# Patient Record
Sex: FEMALE | Race: BLACK OR AFRICAN AMERICAN | NOT HISPANIC OR LATINO | Employment: PART TIME | URBAN - METROPOLITAN AREA
[De-identification: names, ages, dates, MRNs, and addresses within clinical notes are randomized per-mention and may not be internally consistent; named-entity substitution may affect disease eponyms.]

---

## 2022-08-01 ENCOUNTER — OFFICE VISIT (OUTPATIENT)
Dept: INTERNAL MEDICINE | Age: 27
End: 2022-08-01

## 2022-08-01 VITALS
HEIGHT: 68 IN | WEIGHT: 231 LBS | OXYGEN SATURATION: 99 % | HEART RATE: 58 BPM | DIASTOLIC BLOOD PRESSURE: 74 MMHG | BODY MASS INDEX: 35.01 KG/M2 | SYSTOLIC BLOOD PRESSURE: 124 MMHG | TEMPERATURE: 97.1 F

## 2022-08-01 DIAGNOSIS — Z72.0 NICOTINE ABUSE: ICD-10-CM

## 2022-08-01 DIAGNOSIS — F39 MOOD DISORDER: ICD-10-CM

## 2022-08-01 DIAGNOSIS — J45.20 MILD INTERMITTENT ASTHMA WITHOUT COMPLICATION: ICD-10-CM

## 2022-08-01 DIAGNOSIS — M79.672 LEFT FOOT PAIN: ICD-10-CM

## 2022-08-01 DIAGNOSIS — F12.10 MARIJUANA ABUSE: ICD-10-CM

## 2022-08-01 DIAGNOSIS — G89.29 CHRONIC LEFT SI JOINT PAIN: ICD-10-CM

## 2022-08-01 DIAGNOSIS — Z76.89 ENCOUNTER TO ESTABLISH CARE: Primary | ICD-10-CM

## 2022-08-01 DIAGNOSIS — G47.30 SLEEP APNEA, UNSPECIFIED TYPE: ICD-10-CM

## 2022-08-01 DIAGNOSIS — F32.A DEPRESSION, UNSPECIFIED DEPRESSION TYPE: ICD-10-CM

## 2022-08-01 DIAGNOSIS — Z30.432 AWAITING REMOVAL OF CONTRACEPTIVE INTRAUTERINE DEVICE (IUD): ICD-10-CM

## 2022-08-01 DIAGNOSIS — M53.3 CHRONIC LEFT SI JOINT PAIN: ICD-10-CM

## 2022-08-01 DIAGNOSIS — F90.2 ATTENTION DEFICIT HYPERACTIVITY DISORDER (ADHD), COMBINED TYPE: ICD-10-CM

## 2022-08-01 PROBLEM — G47.39 OTHER SLEEP APNEA: Status: ACTIVE | Noted: 2022-08-01

## 2022-08-01 PROCEDURE — 99204 OFFICE O/P NEW MOD 45 MIN: CPT

## 2022-08-01 RX ORDER — ALBUTEROL SULFATE 90 UG/1
2 AEROSOL, METERED RESPIRATORY (INHALATION) EVERY 4 HOURS PRN
COMMUNITY

## 2022-08-01 NOTE — PROGRESS NOTES
"    I N T E R N A L  M E D I C I N E  Tamara Banks, APRN    ENCOUNTER DATE:  08/01/2022    Bettye Amador / 27 y.o. / female      CHIEF COMPLAINT / REASON FOR OFFICE VISIT     Establish Care and Leg Pain (left)      ASSESSMENT & PLAN     Diagnoses and all orders for this visit:    1. Encounter to establish care (Primary)  -     CBC & Differential  -     Comprehensive Metabolic Panel  -     Hemoglobin A1c  -     Hepatitis C Antibody  -     Lipid Panel With / Chol / HDL Ratio  -     TSH+Free T4  -     Urinalysis With Microscopic If Indicated (No Culture) - Urine, Clean Catch  -     Vitamin D 25 Hydroxy    2. Chronic left SI joint pain  -     Ambulatory Referral to Physical Therapy Evaluate and treat    3. Left foot pain  -     XR Foot 3+ View Left    4. Sleep apnea, unspecified type  -     Ambulatory Referral to Sleep Medicine    5. Mild intermittent asthma without complication    6. Marijuana abuse    7. Nicotine abuse    8. Awaiting removal of contraceptive intrauterine device (IUD)  -     Ambulatory Referral to Obstetrics / Gynecology    9. Mood disorder (HCC)  -     Ambulatory Referral to Behavioral Health  -     NeuroPsych Testing; Future    10. Attention deficit hyperactivity disorder (ADHD), combined type  -     Ambulatory Referral to Behavioral Health  -     NeuroPsych Testing; Future    11. Depression, unspecified depression type  -     Ambulatory Referral to Behavioral Health  -     NeuroPsych Testing; Future         SUMMARY/DISCUSSION  • She was educated on importance of visiting ED for any worsening back pain, new numbness, tingling, weakness, bowel/ bladder changes.  • Agreeable to update labs and have annual physical in two weeks.  She is waiting for new medical insurance.        Next Appointment with me: Visit date not found    Return in about 1 week (around 8/8/2022) for Annual physical.      VITAL SIGNS     Visit Vitals  /74   Pulse 58   Temp 97.1 °F (36.2 °C) (Temporal)   Ht 171.5 cm (67.5\") " "  Wt 105 kg (231 lb)   LMP 07/03/2022   SpO2 99%   BMI 35.65 kg/m²             Wt Readings from Last 3 Encounters:   08/01/22 105 kg (231 lb)   07/28/22 104 kg (230 lb)     Body mass index is 35.65 kg/m².        MEDICATIONS AT THE TIME OF OFFICE VISIT     Current Outpatient Medications on File Prior to Visit   Medication Sig Dispense Refill   • albuterol sulfate  (90 Base) MCG/ACT inhaler Inhale 2 puffs Every 4 (Four) Hours As Needed.     • cyclobenzaprine (FLEXERIL) 10 MG tablet Take 1 tablet by mouth 3 (Three) Times a Day for 5 days. 15 tablet 0   • methylPREDNISolone (MEDROL) 4 MG dose pack Take as directed on package instructions 21 tablet 0   • traMADol (ULTRAM) 50 MG tablet 1 or 2 tablets every 6 hours as needed for pain 20 tablet 0     No current facility-administered medications on file prior to visit.        HISTORY OF PRESENT ILLNESS     She visited Urgent Care on 07/28/2022 for sensation of numbness/ tingling in L leg.  She is taking tramadol, steroid dose pack and flexeril with resolution of symptoms at today's appointment.  Denies any back pain, numbness, tingling, weakness, bowel/ bladder changes.      Reports she experienced a L knee injury in 2009, for which she was supposed to go to PT.  Started PT, but was unable to finish.  She also reports she experienced a \"hairline fracture\" of L ankle in the past.  She recently injured her L big toe while chasing her pet dogs down the stairs.  She complains of ongoing pain in L big toe x 1 month.      Sleep apnea: has CPAP at home but does not wear, because she needs a new mask.    Asthma: Well controlled.  Has albuterol inhaler within date code at home. +Marijuana and cigar smoker.    As a child, she was diagnosed with bipolar disorder (unknown whether type 1 or 2), ADHD, and depression.  She would like psychiatric evaluation as an adult, because she questions the validity of the diagnoses.  She denies any SI/ HI.  She expresses that she would like " psychiatric evaluation before possibly deciding whether to start medication management.      Patient Care Team:  Tamara Banks APRN as PCP - General (Family Medicine)    REVIEW OF SYSTEMS     Review of Systems   Constitutional: Negative for chills, fever and unexpected weight change.   Respiratory: Negative for cough, chest tightness and shortness of breath.    Cardiovascular: Negative for chest pain, palpitations and leg swelling.   Neurological: Negative for dizziness, weakness, light-headedness and headaches.   Psychiatric/Behavioral: Negative for self-injury and suicidal ideas. The patient is not nervous/anxious.           PHYSICAL EXAMINATION     Physical Exam  Vitals reviewed.   Constitutional:       General: She is not in acute distress.     Appearance: Normal appearance. She is not ill-appearing, toxic-appearing or diaphoretic.   HENT:      Head: Normocephalic and atraumatic.   Cardiovascular:      Rate and Rhythm: Normal rate and regular rhythm.      Heart sounds: Normal heart sounds.   Pulmonary:      Effort: Pulmonary effort is normal.      Breath sounds: Normal breath sounds.   Musculoskeletal:      Lumbar back: Tenderness (L SI) present.      Right lower leg: No edema.      Left lower leg: No edema.   Neurological:      Mental Status: She is alert and oriented to person, place, and time. Mental status is at baseline.      Cranial Nerves: No cranial nerve deficit.      Sensory: No sensory deficit.      Motor: No weakness.      Coordination: Coordination normal.      Gait: Gait normal.   Psychiatric:         Mood and Affect: Mood normal.         Behavior: Behavior normal.         Thought Content: Thought content normal.         Judgment: Judgment normal.           REVIEWED DATA     Labs:           Imaging:            Medical Tests:           Summary of old records / correspondence / consultant report:           Request outside records:

## 2022-08-02 PROBLEM — Z72.0 NICOTINE ABUSE: Status: ACTIVE | Noted: 2022-08-02

## 2022-08-02 PROBLEM — F90.2 ATTENTION DEFICIT HYPERACTIVITY DISORDER (ADHD), COMBINED TYPE: Status: ACTIVE | Noted: 2022-08-02

## 2022-08-02 PROBLEM — J45.20 MILD INTERMITTENT ASTHMA WITHOUT COMPLICATION: Status: ACTIVE | Noted: 2022-08-02

## 2022-08-02 PROBLEM — M53.3 CHRONIC LEFT SI JOINT PAIN: Status: ACTIVE | Noted: 2022-08-02

## 2022-08-02 PROBLEM — F12.10 MARIJUANA ABUSE: Status: ACTIVE | Noted: 2022-08-02

## 2022-08-02 PROBLEM — G89.29 CHRONIC LEFT SI JOINT PAIN: Status: ACTIVE | Noted: 2022-08-02

## 2022-08-02 PROBLEM — Z30.432 AWAITING REMOVAL OF CONTRACEPTIVE INTRAUTERINE DEVICE (IUD): Status: ACTIVE | Noted: 2022-08-02

## 2022-08-02 PROBLEM — F32.A DEPRESSION: Status: ACTIVE | Noted: 2022-08-02

## 2022-08-02 LAB
25(OH)D3+25(OH)D2 SERPL-MCNC: 17.1 NG/ML (ref 30–100)
ALBUMIN SERPL-MCNC: 4 G/DL (ref 3.9–5)
ALBUMIN/GLOB SERPL: 1.3 {RATIO} (ref 1.2–2.2)
ALP SERPL-CCNC: 56 IU/L (ref 44–121)
ALT SERPL-CCNC: 9 IU/L (ref 0–32)
APPEARANCE UR: ABNORMAL
AST SERPL-CCNC: 18 IU/L (ref 0–40)
BASOPHILS # BLD AUTO: 0.1 X10E3/UL (ref 0–0.2)
BASOPHILS NFR BLD AUTO: 1 %
BILIRUB SERPL-MCNC: <0.2 MG/DL (ref 0–1.2)
BILIRUB UR QL STRIP: NEGATIVE
BUN SERPL-MCNC: 15 MG/DL (ref 6–20)
BUN/CREAT SERPL: 18 (ref 9–23)
CALCIUM SERPL-MCNC: 9.8 MG/DL (ref 8.7–10.2)
CHLORIDE SERPL-SCNC: 103 MMOL/L (ref 96–106)
CHOLEST SERPL-MCNC: 124 MG/DL (ref 100–199)
CHOLEST/HDLC SERPL: 2.5 RATIO (ref 0–4.4)
CO2 SERPL-SCNC: 24 MMOL/L (ref 20–29)
COLOR UR: YELLOW
CREAT SERPL-MCNC: 0.83 MG/DL (ref 0.57–1)
EGFRCR SERPLBLD CKD-EPI 2021: 98 ML/MIN/1.73
EOSINOPHIL # BLD AUTO: 0.3 X10E3/UL (ref 0–0.4)
EOSINOPHIL NFR BLD AUTO: 4 %
ERYTHROCYTE [DISTWIDTH] IN BLOOD BY AUTOMATED COUNT: 12 % (ref 11.7–15.4)
GLOBULIN SER CALC-MCNC: 3 G/DL (ref 1.5–4.5)
GLUCOSE SERPL-MCNC: 85 MG/DL (ref 65–99)
GLUCOSE UR QL STRIP: NEGATIVE
HBA1C MFR BLD: 5 % (ref 4.8–5.6)
HCT VFR BLD AUTO: 39.7 % (ref 34–46.6)
HCV AB S/CO SERPL IA: <0.1 S/CO RATIO (ref 0–0.9)
HDLC SERPL-MCNC: 49 MG/DL
HGB BLD-MCNC: 13.4 G/DL (ref 11.1–15.9)
HGB UR QL STRIP: NEGATIVE
IMM GRANULOCYTES # BLD AUTO: 0 X10E3/UL (ref 0–0.1)
IMM GRANULOCYTES NFR BLD AUTO: 0 %
KETONES UR QL STRIP: NEGATIVE
LDLC SERPL CALC-MCNC: 61 MG/DL (ref 0–99)
LEUKOCYTE ESTERASE UR QL STRIP: NEGATIVE
LYMPHOCYTES # BLD AUTO: 3 X10E3/UL (ref 0.7–3.1)
LYMPHOCYTES NFR BLD AUTO: 36 %
MCH RBC QN AUTO: 31.2 PG (ref 26.6–33)
MCHC RBC AUTO-ENTMCNC: 33.8 G/DL (ref 31.5–35.7)
MCV RBC AUTO: 92 FL (ref 79–97)
MICRO URNS: ABNORMAL
MONOCYTES # BLD AUTO: 0.7 X10E3/UL (ref 0.1–0.9)
MONOCYTES NFR BLD AUTO: 8 %
NEUTROPHILS # BLD AUTO: 4.3 X10E3/UL (ref 1.4–7)
NEUTROPHILS NFR BLD AUTO: 51 %
NITRITE UR QL STRIP: NEGATIVE
PH UR STRIP: 6 [PH] (ref 5–7.5)
PLATELET # BLD AUTO: 206 X10E3/UL (ref 150–450)
POTASSIUM SERPL-SCNC: 4.6 MMOL/L (ref 3.5–5.2)
PROT SERPL-MCNC: 7 G/DL (ref 6–8.5)
PROT UR QL STRIP: NEGATIVE
RBC # BLD AUTO: 4.3 X10E6/UL (ref 3.77–5.28)
SODIUM SERPL-SCNC: 139 MMOL/L (ref 134–144)
SP GR UR STRIP: 1.02 (ref 1–1.03)
T4 FREE SERPL-MCNC: 1.09 NG/DL (ref 0.82–1.77)
TRIGL SERPL-MCNC: 70 MG/DL (ref 0–149)
TSH SERPL DL<=0.005 MIU/L-ACNC: 0.99 UIU/ML (ref 0.45–4.5)
UROBILINOGEN UR STRIP-MCNC: 0.2 MG/DL (ref 0.2–1)
VLDLC SERPL CALC-MCNC: 14 MG/DL (ref 5–40)
WBC # BLD AUTO: 8.5 X10E3/UL (ref 3.4–10.8)

## 2022-08-10 ENCOUNTER — OFFICE VISIT (OUTPATIENT)
Dept: INTERNAL MEDICINE | Age: 27
End: 2022-08-10

## 2022-08-10 VITALS
DIASTOLIC BLOOD PRESSURE: 76 MMHG | HEART RATE: 92 BPM | SYSTOLIC BLOOD PRESSURE: 118 MMHG | BODY MASS INDEX: 35.16 KG/M2 | WEIGHT: 232 LBS | OXYGEN SATURATION: 98 % | TEMPERATURE: 97.6 F | HEIGHT: 68 IN

## 2022-08-10 DIAGNOSIS — Z23 ENCOUNTER FOR IMMUNIZATION: ICD-10-CM

## 2022-08-10 DIAGNOSIS — Z11.3 SCREENING EXAMINATION FOR STD (SEXUALLY TRANSMITTED DISEASE): ICD-10-CM

## 2022-08-10 DIAGNOSIS — Z00.00 ANNUAL PHYSICAL EXAM: Primary | ICD-10-CM

## 2022-08-10 PROBLEM — E55.9 VITAMIN D DEFICIENCY: Status: ACTIVE | Noted: 2022-08-10

## 2022-08-10 PROCEDURE — 3008F BODY MASS INDEX DOCD: CPT

## 2022-08-10 PROCEDURE — 90715 TDAP VACCINE 7 YRS/> IM: CPT

## 2022-08-10 PROCEDURE — 0053A COVID-19 (PFIZER) 12+ YRS: CPT

## 2022-08-10 PROCEDURE — 2014F MENTAL STATUS ASSESS: CPT

## 2022-08-10 PROCEDURE — 99395 PREV VISIT EST AGE 18-39: CPT

## 2022-08-10 PROCEDURE — 90471 IMMUNIZATION ADMIN: CPT

## 2022-08-10 PROCEDURE — 91305 COVID-19 (PFIZER) 12+ YRS: CPT

## 2022-08-10 NOTE — PROGRESS NOTES
"    I N T E R N A L  M E D I C I N E  Tamara Banks, APRN      ENCOUNTER DATE:  08/10/2022    Bettye Carrley / 27 y.o. / female      CHIEF COMPLAINT     Annual Exam      VITALS     Visit Vitals  /76   Pulse 92   Temp 97.6 °F (36.4 °C) (Temporal)   Ht 171.5 cm (67.52\")   Wt 105 kg (232 lb)   LMP 08/05/2022   SpO2 98%   BMI 35.78 kg/m²       BP Readings from Last 3 Encounters:   08/10/22 118/76   08/01/22 124/74   07/28/22 110/67     Wt Readings from Last 3 Encounters:   08/10/22 105 kg (232 lb)   08/01/22 105 kg (231 lb)   07/28/22 104 kg (230 lb)      Body mass index is 35.78 kg/m².       MEDICATIONS     Current Outpatient Medications on File Prior to Visit   Medication Sig Dispense Refill   • albuterol sulfate  (90 Base) MCG/ACT inhaler Inhale 2 puffs Every 4 (Four) Hours As Needed.     • methylPREDNISolone (MEDROL) 4 MG dose pack Take as directed on package instructions 21 tablet 0   • traMADol (ULTRAM) 50 MG tablet 1 or 2 tablets every 6 hours as needed for pain 20 tablet 0     No current facility-administered medications on file prior to visit.         HISTORY OF PRESENT ILLNESS     Mekel presents for annual health maintenance visit.    · General health: fair  · Lifestyle:  · Attempting to lose weight?: Yes   · Diet: eats a well balanced, healthy diet, focused on vegetables, lean protein  · Exercise: exercises 3 days/week  · Tobacco: Regular use (few cigarettes/day)   · Alcohol: occasional/infrequent  · Work: Full-time  · Reproductive health:  · Sexually active?: No   · Sexual problems?: No problems  · Concern for STD?: No    · Sees Gynecologist?: Yes   · Sera/Postmenopausal?: No   · Domestic abuse concerns: No   · Depression Screening:      PHQ-2/PHQ-9 Depression Screening 8/1/2022   Little Interest or Pleasure in Doing Things 3-->nearly every day   Feeling Down, Depressed or Hopeless 3-->nearly every day   PHQ-9: Brief Depression Severity Measure Score 6         PHQ-2: 0 (Not depressed)   PHQ-9: 0 " (Negative screening for depression)    Patient Care Team:  Tamara Banks APRN as PCP - General (Family Medicine)      ALLERGIES  No Known Allergies     PFSH:     The following portions of the patient's history were reviewed and updated as appropriate: Allergies / Current Medications / Past Medical History / Surgical History / Social History / Family History    PROBLEM LIST   Patient Active Problem List   Diagnosis   • Other sleep apnea   • Attention deficit hyperactivity disorder (ADHD), combined type   • Depression   • Chronic left SI joint pain   • Awaiting removal of contraceptive intrauterine device (IUD)   • Mild intermittent asthma without complication   • Nicotine abuse   • Marijuana abuse   • Vitamin D deficiency       PAST MEDICAL HISTORY  Past Medical History:   Diagnosis Date   • Asthma    • Sleep apnea        SURGICAL HISTORY  History reviewed. No pertinent surgical history.    SOCIAL HISTORY  Social History     Socioeconomic History   • Marital status: Single   Tobacco Use   • Smoking status: Current Every Day Smoker     Types: Cigars   • Smokeless tobacco: Never Used   Substance and Sexual Activity   • Alcohol use: Not Currently     Alcohol/week: 1.0 standard drink     Types: 1 Glasses of wine per week   • Drug use: Never   • Sexual activity: Not Currently       FAMILY HISTORY  Family History   Problem Relation Age of Onset   • Hypertension Mother    • Heart failure Mother    • Diabetes Mother    • Mental illness Father    • Diabetes Maternal Grandmother    • Hypertension Maternal Grandmother    • Breast cancer Paternal Grandmother    • Cancer Paternal Grandfather        IMMUNIZATION HISTORY  There is no immunization history for the selected administration types on file for this patient.      REVIEW OF SYSTEMS     Review of Systems   Constitutional: Negative for chills, fever and unexpected weight change.   Respiratory: Negative for cough, chest tightness and shortness of breath.    Cardiovascular:  Negative for chest pain, palpitations and leg swelling.   Neurological: Negative for dizziness, weakness, light-headedness and headaches.   Psychiatric/Behavioral: The patient is not nervous/anxious.          PHYSICAL EXAMINATION     Physical Exam  Vitals reviewed.   Constitutional:       General: She is not in acute distress.     Appearance: Normal appearance. She is not ill-appearing, toxic-appearing or diaphoretic.   HENT:      Head: Normocephalic and atraumatic.      Right Ear: Tympanic membrane, ear canal and external ear normal. There is no impacted cerumen.      Left Ear: Tympanic membrane, ear canal and external ear normal. There is no impacted cerumen.      Nose: Nose normal. No congestion or rhinorrhea.      Mouth/Throat:      Mouth: Mucous membranes are moist.      Pharynx: Oropharynx is clear. No oropharyngeal exudate or posterior oropharyngeal erythema.   Eyes:      Extraocular Movements: Extraocular movements intact.      Conjunctiva/sclera: Conjunctivae normal.      Pupils: Pupils are equal, round, and reactive to light.   Cardiovascular:      Rate and Rhythm: Normal rate and regular rhythm.      Heart sounds: Normal heart sounds.   Pulmonary:      Effort: Pulmonary effort is normal. No respiratory distress.      Breath sounds: Normal breath sounds.   Abdominal:      General: Bowel sounds are normal.      Palpations: Abdomen is soft.      Tenderness: There is no abdominal tenderness.   Musculoskeletal:         General: Normal range of motion.      Cervical back: Normal range of motion and neck supple.      Right lower leg: No edema.      Left lower leg: No edema.   Lymphadenopathy:      Cervical: No cervical adenopathy.   Skin:     General: Skin is warm and dry.   Neurological:      General: No focal deficit present.      Mental Status: She is alert and oriented to person, place, and time. Mental status is at baseline.   Psychiatric:         Mood and Affect: Mood normal.         Behavior: Behavior  normal.         Thought Content: Thought content normal.         Judgment: Judgment normal.         REVIEWED DATA      Labs:    Lab Results   Component Value Date     08/01/2022    K 4.6 08/01/2022    CALCIUM 9.8 08/01/2022    AST 18 08/01/2022    ALT 9 08/01/2022    BUN 15 08/01/2022    CREATININE 0.83 08/01/2022       Lab Results   Component Value Date    GLUCOSE 85 08/01/2022    HGBA1C 5.0 08/01/2022    TSH 0.995 08/01/2022    FREET4 1.09 08/01/2022       Lab Results   Component Value Date    LDL 61 08/01/2022    HDL 49 08/01/2022    TRIG 70 08/01/2022    CHOLHDLRATIO 2.5 08/01/2022       Lab Results   Component Value Date    BGOJ00UR 17.1 (L) 08/01/2022        Lab Results   Component Value Date    WBC 8.5 08/01/2022    HGB 13.4 08/01/2022    MCV 92 08/01/2022     08/01/2022       Lab Results   Component Value Date    PROTEIN Negative 08/01/2022    GLUCOSEU Negative 08/01/2022    BLOODU Negative 08/01/2022    NITRITEU Negative 08/01/2022    LEUKOCYTESUR Negative 08/01/2022          Lab Results   Component Value Date    HEPCVIRUSABY <0.1 08/01/2022       Imaging:        Medical Tests:        ASSESSMENT & PLAN     ANNUAL WELLNESS EXAM / PHYSICAL     Diagnoses and all orders for this visit:    1. Annual physical exam (Primary)    2. Screening examination for STD (sexually transmitted disease)  -     Chlamydia trachomatis, Neisseria gonorrhoeae, Trichomonas vaginalis, PCR - Urine, Urine, Clean Catch  -     HIV-1 / O / 2 Ag / Antibody 4th Generation  -     RPR    3. Encounter for immunization  -     Tdap Vaccine Greater Than or Equal To 8yo IM  -     COVID-19 Vaccine (Pfizer) Gray Cap  -     Cancel: Pneumococcal Conjugate Vaccine 20-Valent (PCV20)         Summary/Discussion:     • Agreeable to receive PNA vaccine at later appointment.  • She is in the process of obtaining neuropsych evaluation. She plans to schedule with counselor and psychiatry.    • She is scheduled with OBGYN for IUD removal and  pap.  • She is scheduled with PT and sleep medicine.  • Pt to return for any new or concerning problems.    Next Appointment with me: Visit date not found    Return in about 6 months (around 2/10/2023) for Recheck.      HEALTHCARE MAINTENANCE ISSUES     Cancer Screening:  · Colon: Initial/Next screening at age: 44  · Repeat colon cancer screening: N/A at this time  · Breast: Recommended monthly self exams; annual professional exam  · Mammogram: N/A at this time  · Cervical: 1 year  · Skin: Monthly self skin examination, annual exam by health professional  · Lung: Does not meet criteria for lung cancer screening.   · Other:    Screening Labs & Tests:  · Lab results reviewed & discussed with the patient or test orders placed today.  · EKG:  · CV Screening: Lipid panel  · DEXA (65+ or postmenopausal with risk factors):   · HEP C (If born [birth year]40-16[birth year], or risk factors): Previously had negative screen  · Other:     Immunization/Vaccinations (to be given today unless deferred by patient)  · Influenza: Recommended annual influenza vaccine  · Hepatitis A: Verify immunization records  · Hepatitis B: Verify immunization records  · Tetanus/Pertussis: Administer today  · Pneumovax: Administer today  · Shingles: Not needed at this time  · COVID: Completed primary vaccine series and advised to get a booster shot    Lifestyle Counseling:  · Lifestyle Modifications: Attempt to lose weight, Improve dietary compliance, Increase intensity/regularity of aerobic exercise, Make dinner the lightest meal of day, Quit smoking, Discussed better management of stress/anxiety and Discussed sexual issues, safe sex practices, contraception  · Safety Issues: Always wear seatbelt, Avoid texting while driving   · Use sunscreen, regular skin examination  · Recommended annual dental/vision examination.  · Emotional/Stress/Sleep: Reviewed and  given when appropriate      Health Maintenance   Topic Date Due   • Pneumococcal Vaccine 0-64 (1 - PCV)  Never done   • TDAP/TD VACCINES (1 - Tdap) Never done   • COVID-19 Vaccine (3 - Booster for Pfizer series) 02/03/2022   • PAP SMEAR  Never done   • INFLUENZA VACCINE  10/01/2022   • ANNUAL PHYSICAL  08/11/2023   • HEPATITIS C SCREENING  Completed

## 2022-08-12 ENCOUNTER — CLINICAL SUPPORT (OUTPATIENT)
Dept: INTERNAL MEDICINE | Age: 27
End: 2022-08-12

## 2022-08-12 DIAGNOSIS — Z23 NEED FOR PNEUMOCOCCAL VACCINE: Primary | ICD-10-CM

## 2022-08-12 PROCEDURE — 90677 PCV20 VACCINE IM: CPT

## 2022-08-12 PROCEDURE — 90471 IMMUNIZATION ADMIN: CPT

## 2022-08-16 LAB
C TRACH RRNA SPEC QL NAA+PROBE: NEGATIVE
HIV 1+2 AB+HIV1 P24 AG SERPL QL IA: NON REACTIVE
N GONORRHOEA RRNA SPEC QL NAA+PROBE: NEGATIVE
RPR SER QL: NON REACTIVE
T VAGINALIS RRNA SPEC QL NAA+PROBE: NEGATIVE

## 2022-08-22 ENCOUNTER — HOSPITAL ENCOUNTER (OUTPATIENT)
Dept: PHYSICAL THERAPY | Facility: HOSPITAL | Age: 27
Setting detail: THERAPIES SERIES
Discharge: HOME | End: 2022-08-22

## 2022-08-22 DIAGNOSIS — M53.3 SI (SACROILIAC) PAIN: ICD-10-CM

## 2022-08-22 DIAGNOSIS — M79.605 LEFT LEG PAIN: Primary | ICD-10-CM

## 2022-08-22 PROCEDURE — 97140 MANUAL THERAPY 1/> REGIONS: CPT | Performed by: PHYSICAL THERAPIST

## 2022-08-22 PROCEDURE — 97161 PT EVAL LOW COMPLEX 20 MIN: CPT | Performed by: PHYSICAL THERAPIST

## 2022-08-22 NOTE — THERAPY EVALUATION
Outpatient Physical Therapy Ortho Initial Evaluation  [facility]     Patient Name: Bettye Amador  : 1995  MRN: [id number]  Today's Date: 2022      Visit Date: 2022    Patient Active Problem List   Diagnosis   • Other sleep apnea   • Attention deficit hyperactivity disorder (ADHD), combined type   • Depression   • Chronic left SI joint pain   • Awaiting removal of contraceptive intrauterine device (IUD)   • Mild intermittent asthma without complication   • Nicotine abuse   • Marijuana abuse   • Vitamin D deficiency        Past Medical History:   Diagnosis Date   • Asthma    • Sleep apnea         No past surgical history on file.    Visit Dx:     ICD-10-CM ICD-9-CM   1. Left leg pain  M79.605 729.5   2. SI (sacroiliac) pain  M53.3 724.6          Patient History     Row Name 22 1000             History    Chief Complaint Pain  -GR      Type of Pain Back pain  -GR      Brief Description of Current Complaint Chronic c/o pain in the left leg/back; c/o a hairline fracture in her L ankle since 3rd grade that has always caused her pain, she also has deterioration in the L knee. She can't recall the start of the back/hip pain because she has had pain for so long.  She states she was having trouble at work, pain was worsening so she went in to see her doctor.  She does have pain medication; provides some relief.  She has been putting an ice pack on with some relief.  Helps her sleep.  She c/o intermittent numbness/tingling in the L leg provoked by prolonged standing or palpation. She did do PT for her knee several years ago but only had a few sessions, so cannot really state any changes. She has occasional instability walking; her R is mostly good. She does participate in a kick boxing several times a month; sometimes up to 3x/week; she does think this helps with her pain.  -GR      Patient/Caregiver Goals Relieve pain  -GR      Occupation/sports/leisure activities retail and CNA  -GR               Pain     Pain Location Back;Hip;Leg  -GR      Pain at Present 3  -GR      Pain at Best 0  -GR      Pain at Worst 10  -GR      Is your sleep disturbed? Yes  -GR      Is medication used to assist with sleep? Yes  -GR              Fall Risk Assessment    Any falls in the past year: Yes  -GR      Number of falls reported in the last 12 months 1  -GR      Factors that contributed to the fall: Other (comment)  chasing her dogs; thinks injured L big toe - is pending xray  -GR              Services    Do you plan to receive Home Health services in the near future No  -GR              Daily Activities    Primary Language English  -GR      How does patient learn best? Listening;Reading;Demonstration  -GR      Pt Participated in POC and Goals Yes  -GR              Safety    Are you being hurt, hit, or frightened by anyone at home or in your life? No  -GR      Are you being neglected by a caregiver No  -GR            User Key  (r) = Recorded By, (t) = Taken By, (c) = Cosigned By    Initials Name Provider Type    GR Chetan Herrera, PT Physical Therapist                 PT Ortho     Row Name 08/22/22 1000       Quarter Clearing    Quarter Clearing Lower Quarter Clearing  -GR       Myotomal Screen- Lower Quarter Clearing    Hip flexion (L2) Right:;5 (Normal);Left:;4- (Good -)  -GR    Knee extension (L3) Right:;5 (Normal);Left:;4+ (Good +)  -GR    Ankle DF (L4) Bilateral:;4+ (Good +)  -GR    Knee flexion (S2) Bilateral:;4+ (Good +)  -GR       Lumbar ROM Screen- Lower Quarter Clearing    Lumbar Flexion Normal  -GR    Lumbar Extension Normal  -GR    Lumbar Lateral Flexion Normal  -GR    Lumbar Rotation Normal  -GR       Special Tests/Palpation    Special Tests/Palpation Lumbar/SI  -GR       Lumbar/SI Special Tests    Long Sit Test (Pelvic Malalignment) Positive  R longer by 1 thumb  -GR       Lumbosacral Palpation    Lumbosacral Palpation? Yes  -GR    SI Left:;Tender;Guarded/taut;Swollen  -GR          User Key  (r) = Recorded  By, (t) = Taken By, (c) = Cosigned By    Initials Name Provider Type    Chetan Weiss, PT Physical Therapist                            Therapy Education  Education Details: [employer] HEP KVYQCLCA, POC, expecations, leg length, orthotics for arch support      PT OP Goals     Row Name 08/22/22 1500          PT Short Term Goals    STG Date to Achieve 09/21/22  -GR     STG 1 Patient will be independent with intial HEP.  -GR     STG 1 Progress New  -GR     STG 2 Patient will self correct SI malignment with home technique to improve gait/standing tolerance.  -GR     STG 2 Progress New  -GR            Long Term Goals    LTG Date to Achieve 10/06/22  -GR     LTG 1 Patient kelsey score </=30% disability on the modified DANIELLE to indicate improved perceived ADL performance.  -GR     LTG 1 Progress New  -GR     LTG 2 Patient will optimize footwear/inserts for prolonged standing and walking.  -GR     LTG 2 Progress New  -GR     LTG 3 Patient will demonstrate L hip flexor strength 5/5 per MMT to normalize stance/gait.  -GR     LTG 3 Progress New  -GR            Time Calculation    PT Goal Re-Cert Due Date 11/20/22  -GR           User Key  (r) = Recorded By, (t) = Taken By, (c) = Cosigned By    Initials Name Provider Type    Chetan Weiss, PT Physical Therapist                 PT Assessment/Plan     Row Name 08/22/22 1500          PT Assessment    Functional Limitations Impaired gait;Limitations in community activities;Limitations in functional capacity and performance;Performance in leisure activities  -GR     Impairments Balance;Gait;Muscle strength;Joint mobility;Posture;Range of motion  -GR     Assessment Comments 26 y/o F referered to outpatient PT for L SI jt pain, insidious onset x chronic pain in L back/leg/knee/ankle.  She presents with leg length discrepancy, weakness of L hip flexor, TTP at L SI and impaired positional tolerance sitting and standing.  LLD self corrects with shotgun maneuver.  PMH pertinent for  L ankle fracture, L knee pain, L leg pain. Her condition is stable. Recommend skilled PT to address functional deficits. Thank you for this referral.  -GR     Please refer to paper survey for additional self-reported information No  -GR     Rehab Potential Good  -GR     Patient/caregiver participated in establishment of treatment plan and goals Yes  -GR     Patient would benefit from skilled therapy intervention Yes  -GR            PT Plan    PT Frequency 2x/week  -GR     Predicted Duration of Therapy Intervention (PT) 8 visits  -GR     Planned CPT's? PT EVAL LOW COMPLEXITY: 90377;PT RE-EVAL: 84929;PT THER PROC EA 15 MIN: 82275;PT THER ACT EA 15 MIN: 64563;PT MANUAL THERAPY EA 15 MIN: 06357;PT NEUROMUSC RE-EDUCATION EA 15 MIN: 24808;PT GAIT TRAINING EA 15 MIN: 16288;PT SELF CARE/HOME MGMT/TRAIN EA 15: 57170;PT HOT OR COLD PACK TREAT MCARE;PT ELECTRICAL STIM UNATTEND: ;PT ELECTRICAL STIM ATTD EA 15 MIN: 20527;PT TRACTION LUMBAR: 21887  -GR     Physical Therapy Interventions (Optional Details) home exercise program;lumbar stabilization;manual therapy techniques;modalities;neuromuscular re-education;patient/family education;postural re-education;strengthening;stretching  -GR     PT Plan Comments HEP review. Assess response to tape - consider arch supports in shoes.  Core strength/LE strengthening.  -GR           User Key  (r) = Recorded By, (t) = Taken By, (c) = Cosigned By    Initials Name Provider Type    Chetan Weiss, PT Physical Therapist                   OP Exercises     Row Name 08/22/22 1500             Total Minutes    30141 - PT Therapeutic Exercise Minutes 2  -GR      09241 - PT Manual Therapy Minutes 8  -GR              Exercise 1    Exercise Name 1 intro to [employer] HEP- KVYQCLCA  -GR            User Key  (r) = Recorded By, (t) = Taken By, (c) = Cosigned By    Initials Name Provider Type    Chetan Weiss, PT Physical Therapist              Manual Rx (last 36 hours)     Manual  "Treatments     Row Name 08/22/22 1500             Total Minutes    07253 - PT Manual Therapy Minutes 8  -GR              Manual Rx 1    Manual Rx 1 Location \"shot gun\" manuever for SI correction  -GR      Manual Rx 1 Type 5 x 5  -GR      Manual Rx 1 Duration taught home technique with gold band and pillow as well  -GR              Manual Rx 2    Manual Rx 2 Location Leukotape navicular sling with cover-roll to L foot  -GR            User Key  (r) = Recorded By, (t) = Taken By, (c) = Cosigned By    Initials Name Provider Type    Chetan Weiss, PT Physical Therapist                                      Time Calculation:     Start Time: 1005  Stop Time: 1045  Time Calculation (min): 40 min  Total Timed Code Minutes- PT: 10 minute(s)  Timed Charges  55927 - PT Therapeutic Exercise Minutes: 2  41857 - PT Manual Therapy Minutes: 8  Total Minutes  Timed Charges Total Minutes: 10   Total Minutes: 10     Therapy Charges for Today     Code Description Service Date Service Provider Modifiers Qty    64901120107 HC PT MANUAL THERAPY EA 15 MIN 8/22/2022 Chetan Herrera, PT GP 1    98826328743 HC PT EVAL LOW COMPLEXITY 2 8/22/2022 Cehtan Herrera, PT GP 1                    Chetan Herrera PT  8/22/2022      "

## 2022-08-26 ENCOUNTER — HOSPITAL ENCOUNTER (OUTPATIENT)
Dept: PHYSICAL THERAPY | Facility: HOSPITAL | Age: 27
Setting detail: THERAPIES SERIES
Discharge: HOME | End: 2022-08-26

## 2022-08-26 PROCEDURE — 97110 THERAPEUTIC EXERCISES: CPT | Performed by: PHYSICAL THERAPIST

## 2022-08-26 NOTE — THERAPY TREATMENT NOTE
Outpatient Physical Therapy Ortho Treatment Note  [facility]     Patient Name: Bettye Amador  : 1995  MRN: [id number]  Today's Date: 2022      Visit Date: 2022    Visit Dx:  No diagnosis found.    Patient Active Problem List   Diagnosis   • Other sleep apnea   • Attention deficit hyperactivity disorder (ADHD), combined type   • Depression   • Chronic left SI joint pain   • Awaiting removal of contraceptive intrauterine device (IUD)   • Mild intermittent asthma without complication   • Nicotine abuse   • Marijuana abuse   • Vitamin D deficiency        Past Medical History:   Diagnosis Date   • Asthma    • Sleep apnea         No past surgical history on file.                     PT Assessment/Plan     Row Name 22 1300          PT Assessment    Assessment Comments Patient returns for 1st follow up; time constraints of late arrival.  She is able to self correct LLD with shotgun maneuver, still out of alignment ambulating into clinic. She required cueing to correctly engage transverse abdominus. Posture is improving with minimal cueing.  -GR            PT Plan    PT Plan Comments Assess response to B tape, discuss insert options.  -GR           User Key  (r) = Recorded By, (t) = Taken By, (c) = Cosigned By    Initials Name Provider Type    GR Chetan Herrera, PT Physical Therapist                   OP Exercises     Row Name 22 1200             Subjective Comments    Subjective Comments Appt time 1215, arrival time 1240. States she is feeling better since starting the home program and could tell a difference with the tape on her L foot compared to no tape on the R.  -GR              Subjective Pain    Able to rate subjective pain? yes  -GR      Pre-Treatment Pain Level 0  -GR              Total Minutes    96393 - PT Therapeutic Exercise Minutes 18  -GR              Exercise 1    Exercise Name 1 nustep  -GR      Time 1 5 min  -GR      Additional Comments L5  -GR              Exercise  "2    Exercise Name 2 \"Shotgun maneuver\" SI jt by patient  clam and adduction  -GR      Reps 2 5  -GR      Time 2 5 sec  -GR      Additional Comments BlackTB and green abll  -GR              Exercise 3    Exercise Name 3 PPT with TA draw in  -GR      Cueing 3 Demo  -GR      Sets 3 1  -GR      Reps 3 10  -GR      Time 3 5 sec  -GR      Additional Comments 2 finger palpation by patient  -GR              Exercise 4    Exercise Name 4 March with PPT/TA in hooklying  -GR      Cueing 4 Demo  -GR      Sets 4 1  -GR      Reps 4 10  -GR      Time 4 5 sec  -GR              Exercise 5    Exercise Name 5 Bridge  -GR      Cueing 5 Demo  -GR      Sets 5 1  -GR      Reps 5 10  -GR              Exercise 6    Exercise Name 6 Navicular sling to B arch - leukotape (2) + cover-roll  -GR            User Key  (r) = Recorded By, (t) = Taken By, (c) = Cosigned By    Initials Name Provider Type    GR Chetan Herrera, PT Physical Therapist                                                Time Calculation:   Start Time: 1240  Stop Time: 1258  Time Calculation (min): 18 min  Total Timed Code Minutes- PT: 18 minute(s)  Timed Charges  94436 - PT Therapeutic Exercise Minutes: 18  Total Minutes  Timed Charges Total Minutes: 18   Total Minutes: 18  Therapy Charges for Today     Code Description Service Date Service Provider Modifiers Qty    65652554107  PT THER PROC EA 15 MIN 8/26/2022 Chetan Herrera, PT GP 1                    Chetan Herrera, PT  8/26/2022     "

## 2022-09-01 ENCOUNTER — HOSPITAL ENCOUNTER (OUTPATIENT)
Dept: PHYSICAL THERAPY | Facility: HOSPITAL | Age: 27
Setting detail: THERAPIES SERIES
Discharge: HOME | End: 2022-09-01

## 2022-09-01 DIAGNOSIS — M79.605 LEFT LEG PAIN: Primary | ICD-10-CM

## 2022-09-01 DIAGNOSIS — M53.3 SI (SACROILIAC) PAIN: ICD-10-CM

## 2022-09-01 PROCEDURE — 97110 THERAPEUTIC EXERCISES: CPT | Performed by: PHYSICAL THERAPIST

## 2022-09-01 NOTE — THERAPY TREATMENT NOTE
Outpatient Physical Therapy Ortho Treatment Note  [facility]     Patient Name: Bettye Amador  : 1995  MRN: [id number]  Today's Date: 2022      Visit Date: 2022    Visit Dx:    ICD-10-CM ICD-9-CM   1. Left leg pain  M79.605 729.5   2. SI (sacroiliac) pain  M53.3 724.6       Patient Active Problem List   Diagnosis   • Other sleep apnea   • Attention deficit hyperactivity disorder (ADHD), combined type   • Depression   • Chronic left SI joint pain   • Awaiting removal of contraceptive intrauterine device (IUD)   • Mild intermittent asthma without complication   • Nicotine abuse   • Marijuana abuse   • Vitamin D deficiency        Past Medical History:   Diagnosis Date   • Asthma    • Sleep apnea         No past surgical history on file.                     PT Assessment/Plan     Row Name 22 1400          PT Assessment    Assessment Comments Able to move through large range of motion with exercises this visit. Recommended superfeet orthorics (blue) per improvement in pain with taping technique.  -GR            PT Plan    PT Plan Comments Assess if purchased orthotics, progress with core/LE strengthening.  -GR           User Key  (r) = Recorded By, (t) = Taken By, (c) = Cosigned By    Initials Name Provider Type    Chetan Weiss, PT Physical Therapist                   OP Exercises     Row Name 22 0900             Subjective Comments    Subjective Comments Appt time 0915, arrival time 0930. States she now should have medicaid, but it willing to proceed with a shortened session should she still have to pay out of pocket. She missed last visit due to a blown tire.  -GR              Subjective Pain    Able to rate subjective pain? yes  -GR      Pre-Treatment Pain Level 0  -GR              Total Minutes    94138 - PT Therapeutic Exercise Minutes 20  -GR              Exercise 1    Exercise Name 1 nustep  -GR      Time 1 4 min  -GR      Additional Comments L5  -GR              Exercise  2    Exercise Name 2 HL clam +PPT  -GR      Cueing 2 Demo  -GR      Sets 2 1  -GR      Reps 2 15  -GR      Additional Comments RTB  -GR              Exercise 5    Exercise Name 5 Bridge  -GR      Cueing 5 Demo  -GR      Sets 5 1  -GR      Reps 5 15  -GR      Additional Comments swiss ball  -GR              Exercise 6    Exercise Name 6 Hip adduction +PPT  -GR      Cueing 6 Demo  -GR      Sets 6 1  -GR      Reps 6 15  -GR      Additional Comments towel squeeze  -GR              Exercise 7    Exercise Name 7 recommend superfeet- blue - orthotics  -GR            User Key  (r) = Recorded By, (t) = Taken By, (c) = Cosigned By    Initials Name Provider Type    GR Chetan Herrera, PT Physical Therapist                                                Time Calculation:   Start Time: 0930  Stop Time: 0950  Time Calculation (min): 20 min  Total Timed Code Minutes- PT: 20 minute(s)  Timed Charges  16818 - PT Therapeutic Exercise Minutes: 20  Total Minutes  Timed Charges Total Minutes: 20   Total Minutes: 20  Therapy Charges for Today     Code Description Service Date Service Provider Modifiers Qty    93490571686 HC PT THER PROC EA 15 MIN 9/1/2022 Chetan Herrera, PT GP 1                    Chetan Herrera, PT  9/1/2022

## 2022-10-01 ENCOUNTER — HOSPITAL ENCOUNTER (OUTPATIENT)
Dept: GENERAL RADIOLOGY | Facility: HOSPITAL | Age: 27
Discharge: HOME | End: 2022-10-01

## 2022-10-01 PROCEDURE — 73630 X-RAY EXAM OF FOOT: CPT

## 2022-10-03 DIAGNOSIS — M21.42 PES PLANUS OF LEFT FOOT: ICD-10-CM

## 2022-10-03 DIAGNOSIS — M20.12 HALLUX VALGUS WITH BUNIONS OF LEFT FOOT: ICD-10-CM

## 2022-10-03 DIAGNOSIS — M21.612 HALLUX VALGUS WITH BUNIONS OF LEFT FOOT: ICD-10-CM

## 2022-10-03 DIAGNOSIS — M79.672 LEFT FOOT PAIN: Primary | ICD-10-CM

## 2022-10-26 ENCOUNTER — APPOINTMENT (OUTPATIENT)
Dept: SLEEP MEDICINE | Facility: HOSPITAL | Age: 27
End: 2022-10-26

## 2023-05-22 ENCOUNTER — HOSPITAL ENCOUNTER (EMERGENCY)
Facility: HOSPITAL | Age: 28
Discharge: HOME | End: 2023-05-22
Attending: EMERGENCY MEDICINE | Admitting: EMERGENCY MEDICINE

## 2023-05-22 VITALS
WEIGHT: 235 LBS | OXYGEN SATURATION: 100 % | HEIGHT: 67 IN | RESPIRATION RATE: 18 BRPM | TEMPERATURE: 98.3 F | SYSTOLIC BLOOD PRESSURE: 131 MMHG | BODY MASS INDEX: 36.88 KG/M2 | HEART RATE: 60 BPM | DIASTOLIC BLOOD PRESSURE: 85 MMHG

## 2023-05-22 DIAGNOSIS — N39.0 ACUTE LOWER URINARY TRACT INFECTION: Primary | ICD-10-CM

## 2023-05-22 LAB
B-HCG UR QL: NEGATIVE
BACTERIA UR QL AUTO: ABNORMAL /HPF
BILIRUB UR QL STRIP: NEGATIVE
CLARITY UR: CLEAR
CLUE CELLS SPEC QL WET PREP: NORMAL
COLOR UR: YELLOW
GLUCOSE UR STRIP-MCNC: NEGATIVE MG/DL
HGB UR QL STRIP.AUTO: NEGATIVE
HYALINE CASTS UR QL AUTO: ABNORMAL /LPF
HYDATID CYST SPEC WET PREP: NORMAL
KETONES UR QL STRIP: ABNORMAL
KOH PREP NAIL: NORMAL
LEUKOCYTE ESTERASE UR QL STRIP.AUTO: ABNORMAL
NITRITE UR QL STRIP: NEGATIVE
PH UR STRIP.AUTO: 7 [PH] (ref 5–8)
PROT UR QL STRIP: NEGATIVE
RBC # UR STRIP: ABNORMAL /HPF
REF LAB TEST METHOD: ABNORMAL
SP GR UR STRIP: 1.03 (ref 1–1.03)
SQUAMOUS #/AREA URNS HPF: ABNORMAL /HPF
T VAGINALIS SPEC QL WET PREP: NORMAL
UROBILINOGEN UR QL STRIP: ABNORMAL
WBC # UR STRIP: ABNORMAL /HPF
WBC SPEC QL WET PREP: NORMAL
YEAST GENITAL QL WET PREP: NORMAL

## 2023-05-22 PROCEDURE — 87491 CHLMYD TRACH DNA AMP PROBE: CPT | Performed by: EMERGENCY MEDICINE

## 2023-05-22 PROCEDURE — 87086 URINE CULTURE/COLONY COUNT: CPT | Performed by: EMERGENCY MEDICINE

## 2023-05-22 PROCEDURE — 99284 EMERGENCY DEPT VISIT MOD MDM: CPT

## 2023-05-22 PROCEDURE — 87186 SC STD MICRODIL/AGAR DIL: CPT | Performed by: EMERGENCY MEDICINE

## 2023-05-22 PROCEDURE — 87210 SMEAR WET MOUNT SALINE/INK: CPT | Performed by: EMERGENCY MEDICINE

## 2023-05-22 PROCEDURE — 87220 TISSUE EXAM FOR FUNGI: CPT | Performed by: EMERGENCY MEDICINE

## 2023-05-22 PROCEDURE — 81025 URINE PREGNANCY TEST: CPT | Performed by: EMERGENCY MEDICINE

## 2023-05-22 PROCEDURE — 87591 N.GONORRHOEAE DNA AMP PROB: CPT | Performed by: EMERGENCY MEDICINE

## 2023-05-22 PROCEDURE — 81001 URINALYSIS AUTO W/SCOPE: CPT

## 2023-05-22 RX ORDER — NITROFURANTOIN 25; 75 MG/1; MG/1
100 CAPSULE ORAL 2 TIMES DAILY
Qty: 14 CAPSULE | Refills: 0 | Status: SHIPPED | OUTPATIENT
Start: 2023-05-22

## 2023-05-22 NOTE — ED PROVIDER NOTES
EMERGENCY DEPARTMENT ENCOUNTER    CHIEF COMPLAINT  Chief Complaint: Vaginal itching, dysuria  History given by: Patient  History limited by: Nothing  Room Number: 12/12  PMD: Tamara Banks APRN      HPI:  Pt is a 28 y.o. female presents complaining of vaginal discharge with yellow discharge, itching over the past 3 days after using a sex toy that she reports she did not wash well.  Patient reports she has not engaged in sexual activity with another individual for months, reports there is no chance she could be pregnant.  Patient reports that the device was not made out of latex.  Patient reports pain with urination, urinary frequency, denies chest pain, shortness of air, abdominal pain, nausea/vomiting.  Patient reports she has a birth control implant in her arm that she has had for approximately 4 years, is aware that it may no longer be effective.      Aggravating Factors: Using unwashed sex toy  Alleviating Factors: Nothing  Treatment before arrival: Nothing  Chronic or social conditions impacting care: None none    Additional sources:  Discussed/ obtained information from independent historians: Patient gave entire history    External (non-ED) record review:   Patient with history of mild asthma, nicotine abuse, mood disorder,        PAST MEDICAL HISTORY  Active Ambulatory Problems     Diagnosis Date Noted   • Other sleep apnea 08/01/2022   • Attention deficit hyperactivity disorder (ADHD), combined type 08/02/2022   • Depression 08/02/2022   • Chronic left SI joint pain 08/02/2022   • Awaiting removal of contraceptive intrauterine device (IUD) 08/02/2022   • Mild intermittent asthma without complication 08/02/2022   • Nicotine abuse 08/02/2022   • Marijuana abuse 08/02/2022   • Vitamin D deficiency 08/10/2022     Resolved Ambulatory Problems     Diagnosis Date Noted   • No Resolved Ambulatory Problems     Past Medical History:   Diagnosis Date   • Asthma    • Sleep apnea        PAST SURGICAL HISTORY  No past  surgical history on file.    FAMILY HISTORY  Family History   Problem Relation Age of Onset   • Hypertension Mother    • Heart failure Mother    • Diabetes Mother    • Mental illness Father    • Diabetes Maternal Grandmother    • Hypertension Maternal Grandmother    • Breast cancer Paternal Grandmother    • Cancer Paternal Grandfather        SOCIAL HISTORY  Social History     Socioeconomic History   • Marital status: Single   Tobacco Use   • Smoking status: Every Day     Types: Cigars   • Smokeless tobacco: Never   Substance and Sexual Activity   • Alcohol use: Not Currently     Alcohol/week: 1.0 standard drink     Types: 1 Glasses of wine per week   • Drug use: Never   • Sexual activity: Not Currently       ALLERGIES  Patient has no known allergies.    REVIEW OF SYSTEMS  Review of Systems    PHYSICAL EXAM  ED Triage Vitals   Temp Heart Rate Resp BP SpO2   05/22/23 0841 05/22/23 0841 05/22/23 0841 05/22/23 0932 05/22/23 0841   98.3 °F (36.8 °C) 105 18 115/77 98 %      Temp src Heart Rate Source Patient Position BP Location FiO2 (%)   -- -- 05/22/23 0932 05/22/23 0932 --     Lying Right arm        Physical Exam  Vitals and nursing note reviewed. Exam conducted with a chaperone present.   Constitutional:       General: She is in acute distress (mild).      Appearance: She is not toxic-appearing.   HENT:      Head: Normocephalic and atraumatic.   Cardiovascular:      Rate and Rhythm: Normal rate and regular rhythm.      Pulses:           Posterior tibial pulses are 2+ on the right side and 2+ on the left side.      Heart sounds: Normal heart sounds. No murmur heard.  Pulmonary:      Effort: Pulmonary effort is normal. No respiratory distress.      Breath sounds: Normal breath sounds.   Abdominal:      General: Bowel sounds are normal.      Palpations: Abdomen is soft.      Tenderness: There is no abdominal tenderness. There is no guarding or rebound.   Genitourinary:     General: Normal vulva.      Vagina: Vaginal  discharge ( Mild white discharge) present.      Cervix: No cervical motion tenderness.      Uterus: Not enlarged and not tender.       Adnexa:         Right: No mass, tenderness or fullness.          Left: No mass, tenderness or fullness.     Musculoskeletal:         General: Normal range of motion.      Cervical back: Normal range of motion and neck supple.   Skin:     General: Skin is warm and dry.   Neurological:      Mental Status: She is alert and oriented to person, place, and time.   Psychiatric:         Mood and Affect: Affect normal.         LAB RESULTS  Recent Results (from the past 24 hour(s))   Urinalysis With Microscopic If Indicated (No Culture) - Urine, Clean Catch    Collection Time: 05/22/23  9:34 AM    Specimen: Urine, Clean Catch   Result Value Ref Range    Color, UA Yellow Yellow, Straw    Appearance, UA Clear Clear    pH, UA 7.0 5.0 - 8.0    Specific Gravity, UA 1.027 1.005 - 1.030    Glucose, UA Negative Negative    Ketones, UA Trace (A) Negative    Bilirubin, UA Negative Negative    Blood, UA Negative Negative    Protein, UA Negative Negative    Leuk Esterase, UA Moderate (2+) (A) Negative    Nitrite, UA Negative Negative    Urobilinogen, UA 1.0 E.U./dL 0.2 - 1.0 E.U./dL   Urinalysis, Microscopic Only - Urine, Clean Catch    Collection Time: 05/22/23  9:34 AM    Specimen: Urine, Clean Catch   Result Value Ref Range    RBC, UA 0-2 None Seen, 0-2 /HPF    WBC, UA 31-50 (A) None Seen, 0-2 /HPF    Bacteria, UA 4+ (A) None Seen /HPF    Squamous Epithelial Cells, UA 3-6 (A) None Seen, 0-2 /HPF    Hyaline Casts, UA 31-50 None Seen /LPF    Methodology Automated Microscopy    Pregnancy, Urine - Urine, Clean Catch    Collection Time: 05/22/23  9:34 AM    Specimen: Urine, Clean Catch   Result Value Ref Range    HCG, Urine QL Negative Negative   KOH Prep - Swab, Vagina    Collection Time: 05/22/23 12:09 PM    Specimen: Vagina; Swab   Result Value Ref Range    KOH Prep No yeast or hyphal elements seen No  yeast or hyphal elements seen   Wet Prep, Genital - Swab, Vagina    Collection Time: 05/22/23 12:09 PM    Specimen: Vagina; Swab   Result Value Ref Range    YEAST No yeast seen No yeast seen    HYPHAL ELEMENTS No Hyphal elements seen No Hyphal elements seen    WBC'S No WBC's seen No WBC's seen    Clue Cells, Wet Prep No Clue cells seen No Clue cells seen    Trichomonas, Wet Prep No Trichomonas seen No Trichomonas seen       I ordered the above labs and reviewed the results    RADIOLOGY  No Radiology Exams Resulted Within Past 24 Hours    I ordered the above noted radiological studies. Interpreted by radiologist. Viewed and interpreted by me in PACS - .       PROCEDURES  Procedures      MEDICATIONS GIVEN IN THE EMERGENCY DEPARTMENT  Medications - No data to display        MEDICAL DECISION MAKING  Results were reviewed/discussed with the patient and they were also made aware of online access. Pt also made aware that some labs, such as cultures, will not be resulted during ER visit and followup with PMD is necessary.   EKGs and labs independently viewed and interpreted by me.  Discussion below represents my analysis of pertinent findings related to patient's condition, differential diagnosis, treatment plan and final disposition.    Differential diagnosis includes but is not limited to vaginal irritation, vaginal candidiasis, trichomonas, bacterial vaginosis, STD, UTI, herpes outbreak,    Independent interpretation of labs, radiology studies, and discussions with consultants:         Shared decision making: Patient voices understanding and need to follow-up for pelvic culture results, follow-up with gynecologist for recheck, further testing, treatment as needed.      MDM       DIAGNOSIS  Final diagnoses:   Acute lower urinary tract infection       DISPOSITION  DISCHARGE    Patient discharged in stable condition.    Reviewed implications of results, diagnosis, meds, responsibility to follow up, warning signs and  symptoms of possible worsening, potential complications and reasons to return to ER, including occulta urinating, fevers, abdominal pain, persistent vomiting or other concerns..    Patient/Family voiced understanding of above instructions.    Discussed plan for discharge, as there is no emergent indication for admission. Patient referred to primary care provider for BP management due to today's BP. Pt/family is agreeable and understands need for follow up and repeat testing.  Pt is aware that discharge does not mean that nothing is wrong but it indicates no emergency is present that requires admission and they must continue care with follow-up as given below or physician of their choice.     FOLLOW-UP  Tamara Banks, APRN  [address]  [facility] [address]  [facility] [id number] [phone]    Schedule an appointment as soon as possible for a visit in 3 days  EVEN IF WELL    Collins Osorio MD  [address] [facility] [id number] [phone]    Schedule an appointment as soon as possible for a visit in 3 days  EVEN IF WELL    [facility]  [address] [address]  [phone]  Schedule an appointment as soon as possible for a visit in 3 days  EVEN IF WELL         Medication List      New Prescriptions    nitrofurantoin (macrocrystal-monohydrate) 100 MG capsule  Commonly known as: MACROBID  Take 1 capsule by mouth 2 (Two) Times a Day.           Where to Get Your Medications      These medications were sent to "[id number] - [place], [place] - [address]  AT [facility] - [phone]  - [phone] FX [address] , [facility] [address]    Phone: [phone]   · nitrofurantoin (macrocrystal-monohydrate) 100 MG capsule           Latest Documented Vital Signs:  As of 13:35 EDT  BP- 115/68 HR- 62 Temp- 98.3 °F (36.8 °C) O2 sat- 98%    --  Full protective equipment was worn throughout this patient encounter including a face mask, eye  protection and gloves. Hand hygiene was performed before donning protective equipment and after removal when leaving the room.     Please note that portions of this note were completed with a voice recognition program.       Note Disclaimer: At [facility], we believe that sharing information builds trust and better relationships. You are receiving this note because you are receiving care at [facility] or recently visited. It is possible you will see health information before a provider has talked with you about it. This kind of information can be easy to misunderstand. To help you fully understand what it means for your health, we urge you to discuss this note with your provider.     Liliana Singleton MD  05/22/23 [id number]

## 2023-05-22 NOTE — DISCHARGE INSTRUCTIONS
You are advised to follow closely with Dr. Collins Osorio or gynecologist of your choice in 2-3 days for recheck, final results of lab work and imaging testing, and further testing/treatment as needed.  Follow with for gonorrhea/committee of results in the next 2 to 3 days.    Drink at least 64 ounces of fluids daily.  Take probiotics daily.      Please return to the emergency department immediately with chest pain different than usual for you, shortness of air, abdominal pain, persistent vomiting/fever, blood in emesis or stool, lightheadedness/fainting, problems with speech, one sided weakness/numbness, new incontinence, problems with vision,  or for worsening of symptoms or other concerns.

## 2023-05-24 LAB
C TRACH RRNA SPEC QL NAA+PROBE: NEGATIVE
N GONORRHOEA RRNA SPEC QL NAA+PROBE: NEGATIVE

## 2023-05-25 LAB — BACTERIA SPEC AEROBE CULT: ABNORMAL

## 2023-06-10 ENCOUNTER — APPOINTMENT (OUTPATIENT)
Dept: GENERAL RADIOLOGY | Facility: HOSPITAL | Age: 28
End: 2023-06-10
Payer: MEDICAID

## 2023-06-10 ENCOUNTER — HOSPITAL ENCOUNTER (EMERGENCY)
Facility: HOSPITAL | Age: 28
Discharge: HOME | End: 2023-06-10
Attending: EMERGENCY MEDICINE
Payer: MEDICAID

## 2023-06-10 VITALS
WEIGHT: 230 LBS | HEIGHT: 67 IN | DIASTOLIC BLOOD PRESSURE: 72 MMHG | HEART RATE: 78 BPM | BODY MASS INDEX: 36.1 KG/M2 | TEMPERATURE: 99.6 F | SYSTOLIC BLOOD PRESSURE: 128 MMHG | RESPIRATION RATE: 18 BRPM | OXYGEN SATURATION: 100 %
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
## 2023-06-10 DIAGNOSIS — S93.402A SPRAIN OF LEFT ANKLE, UNSPECIFIED LIGAMENT, INITIAL ENCOUNTER: ICD-10-CM

## 2023-06-10 DIAGNOSIS — S83.92XA SPRAIN OF LEFT KNEE, UNSPECIFIED LIGAMENT, INITIAL ENCOUNTER: ICD-10-CM

## 2023-06-10 PROCEDURE — 73610 X-RAY EXAM OF ANKLE: CPT

## 2023-06-10 PROCEDURE — 73630 X-RAY EXAM OF FOOT: CPT

## 2023-06-10 PROCEDURE — 73560 X-RAY EXAM OF KNEE 1 OR 2: CPT

## 2023-06-10 PROCEDURE — 99283 EMERGENCY DEPT VISIT LOW MDM: CPT

## 2023-06-10 RX ORDER — DICLOFENAC SODIUM 75 MG/1
75 TABLET, DELAYED RELEASE ORAL 2 TIMES DAILY PRN
Qty: 20 TABLET | Refills: 0 | Status: SHIPPED | OUTPATIENT
Start: 2023-06-10

## 2023-06-10 RX ORDER — IBUPROFEN 800 MG/1
800 TABLET ORAL ONCE
Status: COMPLETED | OUTPATIENT
Start: 2023-06-10 | End: 2023-06-10

## 2023-06-10 RX ADMIN — IBUPROFEN 800 MG: 800 TABLET, FILM COATED ORAL at 15:47

## 2023-06-10 NOTE — ED PROVIDER NOTES
EMERGENCY DEPARTMENT ENCOUNTER    Room Number:  03/03  Date of encounter:  6/10/2023  PCP: Tamara Banks APRN  Historian: Patient  Chronic or social conditions impacting care (social determinants of health): Nothing      I used full protective equipment while examining this patient.  This includes face mask, gloves and protective eyewear.  I washed my hands before entering the room and immediately upon leaving the room      HPI:  Chief Complaint: Fall  A complete HPI/ROS/PMH/PSH/SH/FH are unobtainable due to: Nothing    Context: Bettye Amador is a 28 y.o. female who presents to the ED c/o injuries sustained in a mechanical fall prior to arrival.  Patient states she was walking her dogs when one of the dogs pulled her down.  Patient fell onto her left side.  Patient's left knee seem to twist and buckle.  She complains of achy, constant, severe left knee pain.  She states the knee feels unstable.  She also injured her left medial ankle and left big toe.  She denies any head, neck, trunk injuries.  She was ambulatory after the fall.  She did drive herself to the ER.  She reports a prior history of chronic left knee and left ankle trouble.  She does not have an orthopedist that she has seen recently.    Review of prior external notes (non-ED):   I reviewed primary care office visit from 8/10/2022.  Patient was seen for annual physical exam.    Review of prior external test results outside of this encounter:  I reviewed a normal CMP from 8/1/2022.  This showed a creatinine of 0.83, potassium 4.6.    PAST MEDICAL HISTORY  Active Ambulatory Problems     Diagnosis Date Noted    Other sleep apnea 08/01/2022    Attention deficit hyperactivity disorder (ADHD), combined type 08/02/2022    Depression 08/02/2022    Chronic left SI joint pain 08/02/2022    Awaiting removal of contraceptive intrauterine device (IUD) 08/02/2022    Mild intermittent asthma without complication 08/02/2022    Nicotine abuse 08/02/2022    Marijuana  abuse 08/02/2022    Vitamin D deficiency 08/10/2022     Resolved Ambulatory Problems     Diagnosis Date Noted    No Resolved Ambulatory Problems     Past Medical History:   Diagnosis Date    Asthma     Sleep apnea          PAST SURGICAL HISTORY  History reviewed. No pertinent surgical history.      FAMILY HISTORY  Family History   Problem Relation Age of Onset    Hypertension Mother     Heart failure Mother     Diabetes Mother     Mental illness Father     Diabetes Maternal Grandmother     Hypertension Maternal Grandmother     Breast cancer Paternal Grandmother     Cancer Paternal Grandfather          SOCIAL HISTORY  Social History     Socioeconomic History    Marital status: Single   Tobacco Use    Smoking status: Every Day     Packs/day: 1.00     Types: Cigarettes    Smokeless tobacco: Never   Vaping Use    Vaping Use: Every day   Substance and Sexual Activity    Alcohol use: Yes     Alcohol/week: 1.0 standard drink     Types: 1 Glasses of wine per week    Drug use: Never    Sexual activity: Not Currently         ALLERGIES  Patient has no known allergies.        REVIEW OF SYSTEMS  All systems reviewed and negative except for those discussed in HPI.       PHYSICAL EXAM    I have reviewed the triage vital signs and nursing notes.    ED Triage Vitals [06/10/23 1503]   Temp Heart Rate Resp BP SpO2   99.6 °F (37.6 °C) 102 20 116/68 100 %      Temp src Heart Rate Source Patient Position BP Location FiO2 (%)   -- Monitor -- -- --       Physical Exam  GENERAL: Alert, oriented, not distressed  HENT: head atraumatic, no tenderness  EYES: no scleral icterus, EOMI  CV: regular rhythm, regular rate, no murmur  RESPIRATORY: normal effort, CTA  ABDOMEN: soft, nontender  MUSCULOSKELETAL: Mild diffuse tenderness to the left lateral knee.  No deformity.  Full range of motion.  No laxity.  Mild tenderness to the left medial ankle without deformity.  No ankle laxity.  Mild tenderness to the left great toe without deformity.  Full  range of motion.  NEURO: alert, moves all extremities, follows commands  SKIN: warm, dry    RADIOLOGY  XR Knee 1 or 2 View Left, XR Ankle 3+ View Left, XR Foot 3+ View Left    Result Date: 6/10/2023  TWO RADIOGRAPHIC VIEWS OF THE LEFT KNEE, THREE RADIOGRAPHIC VIEWS OF LEFT FOOT, AND THREE RADIOGRAPHIC VIEWS OF THE LEFT ANKLE  CLINICAL HISTORY: Fall with left knee, left ankle, and left foot pain.  FINDINGS:  LEFT KNEE: Two radiographic views of the left knee were obtained. There is no evidence for acute fracture or bony malalignment. There is a lucency seen at the level of the tibial tuberosity which is suggestive of old Osgood-Schlatter's disease.  LEFT FOOT: Three radiographic views of left foot were obtained. These images demonstrate a hallux valgus deformity of the left foot. Otherwise, there is no evidence for acute fracture or bony malalignment.  LEFT ANKLE: Three radiographic views of the left ankle were obtained. These demonstrate no evidence for acute fracture or bony malalignment.  This report was finalized on 6/10/2023 4:31 PM by Dr. Manuel Tuttle M.D.       I ordered the above noted radiological studies. Reviewed by me and discussed with radiologist.  See dictation for official radiology interpretation.      MEDICATIONS GIVEN IN ER    Medications   ibuprofen (ADVIL,MOTRIN) tablet 800 mg (800 mg Oral Given 6/10/23 1547)         ADDITIONAL ORDERS CONSIDERED BUT NOT ORDERED:  Nothing      PROGRESS, DATA ANALYSIS, CONSULTS, AND MEDICAL DECISION MAKING    All labs have been independently interpreted by myself.  All radiology studies have been independently interpreted by myself and discussed with radiologist dictating the report.   EKG's independently interpreted by myself.  Discussion below represents my analysis of pertinent findings related to patient's condition, differential diagnosis, treatment plan and final disposition.    I have discussed case with Dr. Perez, emergency room physician.  He has performed  his own bedside examination and agrees with treatment plan.    ED Course as of 06/10/23 2022   Sat Marcin 10, 2023   1521 Patient presents with left knee, left ankle, left great toe pain after mechanical fall prior to arrival.  No head, neck, trunk injuries.  No deformity noted on exam.  Patient has been ambulatory.  Plan for x-rays and reevaluation. [EE]   1546 Left knee, left ankle, left foot films independently interpreted myself show no evidence of acute fracture.  There is a well-corticated ossicle density off the tibial tuberosity. [EE]   1613 I discussed x-ray findings with Dr. Tuttle.  He agrees with interpretation.  No acute fracture seen. [EE]   1704 Updated patient on work-up.  Plan to discharge her with a knee immobilizer and crutches.  We will refer her to an orthopedist. [EE]      ED Course User Index  [EE] Ash Delaney PA       AS OF 20:22 EDT VITALS:    BP - 128/72  HR - 78  TEMP - 99.6 °F (37.6 °C)  O2 SATS - 100%        DIAGNOSIS  Final diagnoses:   Fall, initial encounter   Sprain of left knee, unspecified ligament, initial encounter   Sprain of left ankle, unspecified ligament, initial encounter         DISPOSITION  Discharged      Dictated utilizing Dragon dictation     Ash Delaney PA  06/10/23 2022

## 2023-06-10 NOTE — ED TRIAGE NOTES
Sustained a fall earlier today while walking her dog, felt a pop and now feels that her left leg is instable. Left big toe has decreased ROM, and left ankle pain. Denies head injury.

## 2023-06-10 NOTE — Clinical Note
[facility] EMERGENCY DEPARTMENT  [address] [facility] [address]  Phone: [phone]    Bettye Amador was seen and treated in our emergency department on 6/10/2023.  She may return to work on 05/13/2023.         Thank you for choosing [facility].    Ash Delaney, PA

## 2023-06-10 NOTE — Clinical Note
[facility] EMERGENCY DEPARTMENT  [address] [facility] [address]  Phone: [phone]    Bettye Amador was seen and treated in our emergency department on 6/10/2023.  She may return to work on 06/12/2023.         Thank you for choosing [facility].    Ash Delaney, PA

## 2023-09-11 ENCOUNTER — OFFICE VISIT (OUTPATIENT)
Dept: INTERNAL MEDICINE | Age: 28
End: 2023-09-11
Payer: MEDICAID

## 2023-09-11 VITALS
OXYGEN SATURATION: 98 % | DIASTOLIC BLOOD PRESSURE: 72 MMHG | HEIGHT: 67 IN | TEMPERATURE: 97.6 F | SYSTOLIC BLOOD PRESSURE: 134 MMHG | HEART RATE: 78 BPM | BODY MASS INDEX: 37.98 KG/M2 | WEIGHT: 242 LBS

## 2023-09-11 DIAGNOSIS — G47.30 SLEEP APNEA, UNSPECIFIED TYPE: ICD-10-CM

## 2023-09-11 DIAGNOSIS — R35.0 URINARY FREQUENCY: ICD-10-CM

## 2023-09-11 DIAGNOSIS — Z00.00 ANNUAL PHYSICAL EXAM: Primary | ICD-10-CM

## 2023-09-11 DIAGNOSIS — F41.9 ANXIETY AND DEPRESSION: ICD-10-CM

## 2023-09-11 DIAGNOSIS — F32.A ANXIETY AND DEPRESSION: ICD-10-CM

## 2023-09-11 DIAGNOSIS — Z59.89 INSURANCE COVERAGE PROBLEMS: ICD-10-CM

## 2023-09-11 DIAGNOSIS — E55.9 VITAMIN D DEFICIENCY: ICD-10-CM

## 2023-09-11 DIAGNOSIS — Z59.819 HOUSING INSTABILITY: ICD-10-CM

## 2023-09-11 DIAGNOSIS — B37.31 VAGINAL YEAST INFECTION: Primary | ICD-10-CM

## 2023-09-11 DIAGNOSIS — F31.32 BIPOLAR AFFECTIVE DISORDER, CURRENTLY DEPRESSED, MODERATE: ICD-10-CM

## 2023-09-11 LAB
BILIRUB BLD-MCNC: NEGATIVE MG/DL
CLARITY, POC: CLEAR
COLOR UR: YELLOW
GLUCOSE UR STRIP-MCNC: NEGATIVE MG/DL
KETONES UR QL: NEGATIVE
LEUKOCYTE EST, POC: NEGATIVE
NITRITE UR-MCNC: NEGATIVE MG/ML
PH UR: 6 [PH] (ref 5–8)
PROT UR STRIP-MCNC: NEGATIVE MG/DL
RBC # UR STRIP: NEGATIVE /UL
SP GR UR: 1 (ref 1–1.03)
UROBILINOGEN UR QL: ABNORMAL

## 2023-09-11 RX ORDER — BUPROPION HYDROCHLORIDE 150 MG/1
150 TABLET ORAL DAILY
Qty: 30 TABLET | Refills: 1 | Status: SHIPPED | OUTPATIENT
Start: 2023-09-11

## 2023-09-11 RX ORDER — CYCLOBENZAPRINE HCL 10 MG
TABLET ORAL
COMMUNITY
Start: 2023-06-11 | End: 2023-09-11

## 2023-09-11 RX ORDER — FLUCONAZOLE 150 MG/1
150 TABLET ORAL ONCE
Qty: 1 TABLET | Refills: 0 | Status: SHIPPED | OUTPATIENT
Start: 2023-09-11 | End: 2023-09-11

## 2023-09-11 SDOH — ECONOMIC STABILITY - INCOME SECURITY: OTHER PROBLEMS RELATED TO HOUSING AND ECONOMIC CIRCUMSTANCES: Z59.89

## 2023-09-11 SDOH — ECONOMIC STABILITY - HOUSING INSECURITY: HOUSING INSTABILITY UNSPECIFIED: Z59.819

## 2023-09-11 NOTE — PROGRESS NOTES
"    I N T E R N A L  M E D I C I N E  Tamara Banks, APRN      ENCOUNTER DATE:  09/11/2023    Bettye Amador / 28 y.o. / female      CHIEF COMPLAINT     Annual Exam    Last seen in our office on August 10, 2022 to establish care.  At that time, she told me she was in the process of obtaining neuropsych evaluation due to concerns for possible bipolar disorder. She had planned to schedule with counselor and psychiatry, but unfortunately, lost her job and was unable to afford any follow up appointments.  She reports continued anxiety, depression symptoms, which worsened after she lost her home.  She reports very infrequency hypomania episodes She has been living in a hotel and has plans to transition to apartment in th enear future.  She is currently depressed but denies any SI/ HI.  She is requesting JENNIFER letter for her two dogs which provide emotional support.  Has taken Wellbutrin in the past with some benefit.      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  She is in the process of obtaining health insurance.      She reports 3 day history of urinary frequency, vaginal irritation and discharge.  Expresses concern for possible UTI because she recently had to wash her clothes in the bathroom.  Denies any concern for STIs.    MALLORY: She is wearing CPAP but has not been seen in several years.  Expresses concern that she may need setting adjustment.      VITALS     Visit Vitals  /72   Pulse 78   Temp 97.6 °F (36.4 °C)   Ht 170.2 cm (67.01\")   Wt 110 kg (242 lb)   LMP 08/11/2023   SpO2 98%   BMI 37.89 kg/m²       BP Readings from Last 3 Encounters:   09/11/23 134/72   06/10/23 128/72   05/22/23 131/85     Wt Readings from Last 3 Encounters:   09/11/23 110 kg (242 lb)   06/10/23 104 kg (230 lb)   05/22/23 107 kg (235 lb)      Body mass index is 37.89 kg/m².       MEDICATIONS     Current Outpatient Medications on File Prior to Visit   Medication Sig Dispense Refill    albuterol sulfate  (90 Base) " MCG/ACT inhaler Inhale 2 puffs Every 4 (Four) Hours As Needed.      [DISCONTINUED] cyclobenzaprine (FLEXERIL) 10 MG tablet TAKE 1 TABLET BY MOUTH THREE TIMES DAILY FOR 5 DAYS (Patient not taking: Reported on 2023)      [DISCONTINUED] diclofenac (VOLTAREN) 75 MG EC tablet Take 1 tablet by mouth 2 (Two) Times a Day As Needed (pain). (Patient not taking: Reported on 2023) 20 tablet 0     No current facility-administered medications on file prior to visit.         HISTORY OF PRESENT ILLNESS     Mekel presents for annual health maintenance visit.    General health: good  Lifestyle:  Attempting to lose weight?: Yes   Diet: has not been eating as healthy, due to living situation  Exercise: active at work, averages 11,000 steps daily  Tobacco: Regular use (~1 pack/day) , working towards quitting  Alcohol: occasional/infrequent  Work: Full-time  Reproductive health:  Sexually active?: No, not currently  Sexual problems?: No problems  Concern for STD?: No    Sees Gynecologist?: No   Sera/Postmenopausal?: No   Domestic abuse concerns: No   Depression Screenin/11/2023    10:01 AM   PHQ-2/PHQ-9 Depression Screening   Little Interest or Pleasure in Doing Things 0-->not at all   Feeling Down, Depressed or Hopeless 2-->more than half the days   PHQ-9: Brief Depression Severity Measure Score 2         PHQ-2: 5 (Proceed to PHQ-9)   PHQ-9: 10-14 (Moderate Depression)    Patient Care Team:  Tamara Banks APRN as PCP - General (Family Medicine)      ALLERGIES  No Known Allergies     PFSH:     The following portions of the patient's history were reviewed and updated as appropriate: Allergies / Current Medications / Past Medical History / Surgical History / Social History / Family History    PROBLEM LIST   Patient Active Problem List   Diagnosis    Other sleep apnea    Attention deficit hyperactivity disorder (ADHD), combined type    Depression    Chronic left SI joint pain    Awaiting removal of contraceptive  intrauterine device (IUD)    Mild intermittent asthma without complication    Nicotine abuse    Marijuana abuse    Vitamin D deficiency       PAST MEDICAL HISTORY  Past Medical History:   Diagnosis Date    Asthma     Sleep apnea        SURGICAL HISTORY  History reviewed. No pertinent surgical history.    SOCIAL HISTORY  Social History     Socioeconomic History    Marital status: Single   Tobacco Use    Smoking status: Every Day     Packs/day: 1.00     Types: Cigarettes    Smokeless tobacco: Never   Vaping Use    Vaping Use: Every day   Substance and Sexual Activity    Alcohol use: Yes     Alcohol/week: 1.0 standard drink     Types: 1 Glasses of wine per week    Drug use: Never    Sexual activity: Not Currently       FAMILY HISTORY  Family History   Problem Relation Age of Onset    Hypertension Mother     Heart failure Mother     Diabetes Mother     Mental illness Father     Diabetes Maternal Grandmother     Hypertension Maternal Grandmother     Breast cancer Paternal Grandmother     Cancer Paternal Grandfather        IMMUNIZATION HISTORY  Immunization History   Administered Date(s) Administered    COVID-19 (PFIZER) Purple Cap Monovalent 08/12/2021, 09/03/2021    Covid-19 (Pfizer) Gray Cap Monovalent 08/10/2022    Pneumococcal Conjugate 20-Valent (PCV20) 08/12/2022    Tdap 08/10/2022         REVIEW OF SYSTEMS     Review of Systems   Constitutional:  Negative for chills, fever and unexpected weight change.   Respiratory:  Negative for cough, chest tightness and shortness of breath.    Cardiovascular:  Negative for chest pain, palpitations and leg swelling.   Genitourinary:  Positive for frequency and vaginal discharge. Negative for dysuria, hematuria, pelvic pain, vaginal bleeding and vaginal pain.   Neurological:  Negative for dizziness, weakness, light-headedness and headaches.   Psychiatric/Behavioral:  The patient is not nervous/anxious.        PHYSICAL EXAMINATION     Physical Exam  Vitals reviewed.    Constitutional:       General: She is not in acute distress.     Appearance: Normal appearance. She is not ill-appearing, toxic-appearing or diaphoretic.   HENT:      Head: Normocephalic and atraumatic.      Right Ear: Tympanic membrane, ear canal and external ear normal. There is no impacted cerumen.      Left Ear: Tympanic membrane, ear canal and external ear normal. There is no impacted cerumen.      Nose: Nose normal. No congestion or rhinorrhea.      Mouth/Throat:      Mouth: Mucous membranes are moist.      Pharynx: Oropharynx is clear. No oropharyngeal exudate or posterior oropharyngeal erythema.   Eyes:      Extraocular Movements: Extraocular movements intact.      Conjunctiva/sclera: Conjunctivae normal.      Pupils: Pupils are equal, round, and reactive to light.   Cardiovascular:      Rate and Rhythm: Normal rate and regular rhythm.      Heart sounds: Normal heart sounds.   Pulmonary:      Effort: Pulmonary effort is normal. No respiratory distress.      Breath sounds: Normal breath sounds.   Abdominal:      General: Bowel sounds are normal.      Palpations: Abdomen is soft.      Tenderness: There is no abdominal tenderness.   Musculoskeletal:         General: Normal range of motion.      Cervical back: Normal range of motion and neck supple.      Right lower leg: No edema.      Left lower leg: No edema.   Lymphadenopathy:      Cervical: No cervical adenopathy.   Skin:     General: Skin is warm and dry.   Neurological:      General: No focal deficit present.      Mental Status: She is alert and oriented to person, place, and time. Mental status is at baseline.   Psychiatric:         Mood and Affect: Mood normal.         Behavior: Behavior normal.         Thought Content: Thought content normal.         Judgment: Judgment normal.       REVIEWED DATA      Labs:    Lab Results   Component Value Date     08/01/2022    K 4.6 08/01/2022    CALCIUM 9.8 08/01/2022    AST 18 08/01/2022    ALT 9 08/01/2022     BUN 15 08/01/2022    CREATININE 0.83 08/01/2022       Lab Results   Component Value Date    GLUCOSE 85 08/01/2022    HGBA1C 5.0 08/01/2022    TSH 0.995 08/01/2022    FREET4 1.09 08/01/2022       Lab Results   Component Value Date    LDL 61 08/01/2022    HDL 49 08/01/2022    TRIG 70 08/01/2022    CHOLHDLRATIO 2.5 08/01/2022       Lab Results   Component Value Date    SUMO31JI 17.1 (L) 08/01/2022        Lab Results   Component Value Date    WBC 8.5 08/01/2022    HGB 13.4 08/01/2022    MCV 92 08/01/2022     08/01/2022       Lab Results   Component Value Date    PROTEIN Negative 08/01/2022    GLUCOSEU Negative 05/22/2023    BLOODU Negative 05/22/2023    NITRITEU Negative 05/22/2023    LEUKOCYTESUR Negative 09/11/2023          Lab Results   Component Value Date    HEPCVIRUSABY <0.1 08/01/2022       Imaging:        Medical Tests:        ASSESSMENT & PLAN     ANNUAL WELLNESS EXAM / PHYSICAL     Diagnoses and all orders for this visit:    1. Annual physical exam (Primary)  -     CBC & Differential  -     Comprehensive Metabolic Panel  -     Hemoglobin A1c  -     Lipid Panel With / Chol / HDL Ratio  -     TSH+Free T4  -     Ambulatory Referral to Gynecology    2. Bipolar affective disorder, currently depressed, moderate  -     Ambulatory Referral to Psychiatry  -     buPROPion XL (Wellbutrin XL) 150 MG 24 hr tablet; Take 1 tablet by mouth Daily.  Dispense: 30 tablet; Refill: 1    3. Anxiety and depression  -     Ambulatory Referral to Psychiatry    4. Urinary frequency  -     POC Urinalysis Dipstick  -     Urine Culture - Urine, Urine, Clean Catch    5. Vitamin D deficiency  -     Vitamin D,25-Hydroxy    6. Sleep apnea, unspecified type  -     Ambulatory Referral to Sleep Medicine    7. Insurance coverage problems  -     Ambulatory Referral to Social Care Services (Amb Case Mgmt)    8. Housing instability  -     Ambulatory Referral to Social Care Services (Amb Case Mgmt)         Summary/Discussion:     Obtain  urinalysis + culture to rule out UTI.  Encouraged good urinary hygiene.  Discussed risks/ benefits/ side effects of starting Wellbutrin for depression.  Also encouraged to quit smoking.    Placed updated referrals, per pt request.      Class 2 Severe Obesity (BMI >=35 and <=39.9). Obesity-related health conditions include the following: obstructive sleep apnea. Obesity is worsening. BMI is is above average; BMI management plan is completed. We discussed portion control and increasing exercise.    Next Appointment with me: Visit date not found    Return in about 1 month (around 10/11/2023) for Depression/ Anxiety follow up.      HEALTHCARE MAINTENANCE ISSUES     Cancer Screening:  Colon: Initial/Next screening at age: 44  Repeat colon cancer screening: N/A at this time  Breast: Recommended monthly self exams; annual professional exam  Mammogram: N/A at this time  Cervical: Referred to GYN to update pap  Skin: Monthly self skin examination, annual exam by health professional  Lung: Does not meet criteria for lung cancer screening.   Other:    Screening Labs & Tests:  Lab results reviewed & discussed with the patient or test orders placed today.  EKG:  CV Screening: Lipid panel  DEXA (65+ or postmenopausal with risk factors):   HEP C (If born [birth year], or risk factors): Previously had negative screen  Other:     Immunization/Vaccinations (to be given today unless deferred by patient)  Influenza: Recommended annual influenza vaccine  Hepatitis A: Verify immunization records  Hepatitis B: Verify immunization records  Tetanus/Pertussis: Up to date  Pneumovax: Recommended here or at pharmacy  Shingles: Not needed at this time  COVID: Recommended the bivalent booster shot    Lifestyle Counseling:  Lifestyle Modifications: Attempt to lose weight, Improve dietary compliance, Increase intensity/regularity of aerobic exercise, Make dinner the lightest meal of day, Quit smoking, Reduce exposure to stress if possible, and  Discussed better management of stress/anxiety  Safety Issues: Always wear seatbelt, Avoid texting while driving   Use sunscreen, regular skin examination  Recommended annual dental/vision examination.  Emotional/Stress/Sleep: Reviewed and  given when appropriate      Health Maintenance   Topic Date Due    BMI FOLLOWUP  Never done    COVID-19 Vaccine (4 - Pfizer series) 10/21/2023 (Originally 10/5/2022)    PAP SMEAR  09/11/2024 (Originally 8/1/2022)    INFLUENZA VACCINE  10/01/2023    ANNUAL PHYSICAL  09/11/2024    TDAP/TD VACCINES (2 - Td or Tdap) 08/10/2032    HEPATITIS C SCREENING  Completed    Pneumococcal Vaccine 0-64  Completed

## 2023-09-12 ENCOUNTER — REFERRAL TRIAGE (OUTPATIENT)
Dept: CASE MANAGEMENT | Facility: OTHER | Age: 28
End: 2023-09-12
Payer: MEDICAID

## 2023-09-13 ENCOUNTER — PATIENT OUTREACH (OUTPATIENT)
Dept: CASE MANAGEMENT | Facility: OTHER | Age: 28
End: 2023-09-13
Payer: MEDICAID

## 2023-09-13 NOTE — OUTREACH NOTE
Social Work Assessment  Questions/Answers      Flowsheet Row Most Recent Value   Referral Source physician   Reason for Consult community resources, housing concerns/homeless, financial concerns, other (see comments)  [food insecurity]   Preferred Language English   Advance Care Planning Reviewed no concerns identified   People in Home alone   Current Living Arrangements apartment   Primary Care Provided by self   Employment Status employed full-time   Source of Income salary/wages   Financial/Environmental Concerns unable to afford food, insurance inadequate   Application for Public Assistance not applied   Usual Activity Tolerance good   Current Activity Tolerance good   Medications independent   Meal Preparation independent   Housekeeping independent   Laundry independent   Shopping independent   Transportation Anticipated car, drives self          SDOH updated and reviewed with the patient during this program:  Financial Resource Strain: High Risk    Difficulty of Paying Living Expenses: Hard      Food Insecurity: Food Insecurity Present    Worried About Running Out of Food in the Last Year: Sometimes true    Ran Out of Food in the Last Year: Sometimes true      Transportation Needs: No Transportation Needs    Lack of Transportation (Medical): No    Lack of Transportation (Non-Medical): No      Housing Stability: High Risk    Unable to Pay for Housing in the Last Year: Yes    Number of Places Lived in the Last Year: 2    Unstable Housing in the Last Year: Yes      Continuing Care   Community & [facility] TO [facility]    [address] [place], [place] [address]    Phone: [phone]    Resource for: Food Insecurity   DEPARTMENT FOR MEDICAID SERVICES    [facility] [address] [facility] [address]    Phone: [phone]    Resource for: Financial Resource Strain   [place] [address]    Phone: [phone]   [facility]    [address]  [facility] [address]    Phone: [phone]    Resource for: Financial Resource Strain, Food Insecurity     Patient Outreach    MSW outreach to patient to discuss community resources available to assist patient as requested per primary care provider. MSW and patient discussed need for housing and patient states that she has recently moved into a new apartment. Patient will be obtaining health insurance through her job and will have insurance starting mid November. Patient states that she needs assistance with food resources and MSW will message patient information about [facility] food pantries in her area. MSW to also include information for [facility] for financial assistance and food resources. Patient also states that she needs assistance with obtaining therapist and psychiatrist and MSW to provide patient with information for [facility] as they take patients without insurance. Patient can set up services with [facility] until she is able to obtain health insurance. No further needs by patient at this time and she will call back to MSW as needed.     Krystal WHITLEY -   Ambulatory Case Management    9/13/2023, 13:33 EDT

## 2023-09-14 DIAGNOSIS — E55.9 VITAMIN D DEFICIENCY: Primary | ICD-10-CM

## 2023-09-14 LAB
25(OH)D3+25(OH)D2 SERPL-MCNC: 15.4 NG/ML (ref 30–100)
ALBUMIN SERPL-MCNC: 4.3 G/DL (ref 3.5–5.2)
ALBUMIN/GLOB SERPL: 2 G/DL
ALP SERPL-CCNC: 56 U/L (ref 39–117)
ALT SERPL-CCNC: 14 U/L (ref 1–33)
AST SERPL-CCNC: 18 U/L (ref 1–32)
BACTERIA UR CULT: NORMAL
BACTERIA UR CULT: NORMAL
BASOPHILS # BLD AUTO: 0.06 10*3/MM3 (ref 0–0.2)
BASOPHILS NFR BLD AUTO: 0.6 % (ref 0–1.5)
BILIRUB SERPL-MCNC: <0.2 MG/DL (ref 0–1.2)
BUN SERPL-MCNC: 12 MG/DL (ref 6–20)
BUN/CREAT SERPL: 16.2 (ref 7–25)
CALCIUM SERPL-MCNC: 9.7 MG/DL (ref 8.6–10.5)
CHLORIDE SERPL-SCNC: 103 MMOL/L (ref 98–107)
CHOLEST SERPL-MCNC: 134 MG/DL (ref 0–200)
CHOLEST/HDLC SERPL: 2.27 {RATIO}
CO2 SERPL-SCNC: 25.3 MMOL/L (ref 22–29)
CREAT SERPL-MCNC: 0.74 MG/DL (ref 0.57–1)
EGFRCR SERPLBLD CKD-EPI 2021: 112.5 ML/MIN/1.73
EOSINOPHIL # BLD AUTO: 0.21 10*3/MM3 (ref 0–0.4)
EOSINOPHIL NFR BLD AUTO: 2.3 % (ref 0.3–6.2)
ERYTHROCYTE [DISTWIDTH] IN BLOOD BY AUTOMATED COUNT: 11.7 % (ref 12.3–15.4)
GLOBULIN SER CALC-MCNC: 2.2 GM/DL
GLUCOSE SERPL-MCNC: 83 MG/DL (ref 65–99)
HBA1C MFR BLD: 5 % (ref 4.8–5.6)
HCT VFR BLD AUTO: 35.6 % (ref 34–46.6)
HDLC SERPL-MCNC: 59 MG/DL (ref 40–60)
HGB BLD-MCNC: 12.2 G/DL (ref 12–15.9)
IMM GRANULOCYTES # BLD AUTO: 0.04 10*3/MM3 (ref 0–0.05)
IMM GRANULOCYTES NFR BLD AUTO: 0.4 % (ref 0–0.5)
LDLC SERPL CALC-MCNC: 65 MG/DL (ref 0–100)
LYMPHOCYTES # BLD AUTO: 3.37 10*3/MM3 (ref 0.7–3.1)
LYMPHOCYTES NFR BLD AUTO: 36.2 % (ref 19.6–45.3)
MCH RBC QN AUTO: 32.4 PG (ref 26.6–33)
MCHC RBC AUTO-ENTMCNC: 34.3 G/DL (ref 31.5–35.7)
MCV RBC AUTO: 94.4 FL (ref 79–97)
MONOCYTES # BLD AUTO: 0.57 10*3/MM3 (ref 0.1–0.9)
MONOCYTES NFR BLD AUTO: 6.1 % (ref 5–12)
NEUTROPHILS # BLD AUTO: 5.06 10*3/MM3 (ref 1.7–7)
NEUTROPHILS NFR BLD AUTO: 54.4 % (ref 42.7–76)
NRBC BLD AUTO-RTO: 0 /100 WBC (ref 0–0.2)
PLATELET # BLD AUTO: 191 10*3/MM3 (ref 140–450)
POTASSIUM SERPL-SCNC: 4 MMOL/L (ref 3.5–5.2)
PROT SERPL-MCNC: 6.5 G/DL (ref 6–8.5)
RBC # BLD AUTO: 3.77 10*6/MM3 (ref 3.77–5.28)
SODIUM SERPL-SCNC: 136 MMOL/L (ref 136–145)
T4 FREE SERPL-MCNC: 1.11 NG/DL (ref 0.93–1.7)
TRIGL SERPL-MCNC: 38 MG/DL (ref 0–150)
TSH SERPL DL<=0.005 MIU/L-ACNC: 0.8 UIU/ML (ref 0.27–4.2)
VLDLC SERPL CALC-MCNC: 10 MG/DL (ref 5–40)
WBC # BLD AUTO: 9.31 10*3/MM3 (ref 3.4–10.8)

## 2023-09-14 RX ORDER — ERGOCALCIFEROL 1.25 MG/1
50000 CAPSULE ORAL WEEKLY
Qty: 8 CAPSULE | Refills: 0 | Status: SHIPPED | OUTPATIENT
Start: 2023-09-14 | End: 2023-11-03

## 2023-11-30 ENCOUNTER — OFFICE VISIT (OUTPATIENT)
Dept: OBSTETRICS AND GYNECOLOGY | Age: 28
End: 2023-11-30
Payer: MEDICAID

## 2023-11-30 VITALS
BODY MASS INDEX: 36.7 KG/M2 | DIASTOLIC BLOOD PRESSURE: 70 MMHG | SYSTOLIC BLOOD PRESSURE: 120 MMHG | HEIGHT: 67 IN | WEIGHT: 233.8 LBS

## 2023-11-30 DIAGNOSIS — Z30.018 ENCOUNTER FOR INITIAL PRESCRIPTION OF OTHER CONTRACEPTIVES: ICD-10-CM

## 2023-11-30 DIAGNOSIS — Z12.4 SCREENING FOR CERVICAL CANCER: ICD-10-CM

## 2023-11-30 DIAGNOSIS — Z00.00 ENCOUNTER FOR ANNUAL PHYSICAL EXAMINATION EXCLUDING GYNECOLOGICAL EXAMINATION IN A PATIENT OLDER THAN 17 YEARS: Primary | ICD-10-CM

## 2023-11-30 RX ORDER — LACTIC ACID, L-, CITRIC ACID MONOHYDRATE, AND POTASSIUM BITARTRATE 90; 50; 20 MG/5G; MG/5G; MG/5G
1 GEL VAGINAL ONCE AS NEEDED
Qty: 10 APPLICATOR | Refills: 0 | Status: SHIPPED | OUTPATIENT
Start: 2023-11-30

## 2023-11-30 NOTE — PROGRESS NOTES
[facility]   Obstetrics and Gynecology     2023    Patient: Bettye Amador          MR#:[id number]    History of Present Illness    Chief Complaint   Patient presents with    Gynecologic Exam     New GYN here for annual. Pap 3 years ago. Wants to take birth control out of her arm.       28 y.o. female  who presents for annual exam.She also currently has a Nexplanon in place that has . Would like to return for removal. Desires nonhormonal contraception at this time though she is not currently sexually active.     Relevant data reviewed:    Patient's last menstrual period was 2023 (exact date).  Obstetric History:  OB History          0    Para   0    Term   0       0    AB   0    Living   0         SAB   0    IAB   0    Ectopic   0    Molar   0    Multiple   0    Live Births   0               Menstrual History:     Patient's last menstrual period was 2023 (exact date).       Social History     Substance and Sexual Activity   Sexual Activity Not Currently    Partners: Male    Birth control/protection: Nexplanon     ______________________________________  Patient Active Problem List   Diagnosis    Other sleep apnea    Attention deficit hyperactivity disorder (ADHD), combined type    Depression    Chronic left SI joint pain    Awaiting removal of contraceptive intrauterine device (IUD)    Mild intermittent asthma without complication    Nicotine abuse    Marijuana abuse    Vitamin D deficiency     Past Medical History:   Diagnosis Date    Asthma     Sleep apnea      History reviewed. No pertinent surgical history.  Social History     Tobacco Use   Smoking Status Every Day    Packs/day: 1    Types: Cigarettes   Smokeless Tobacco Never     Family History   Problem Relation Age of Onset    Mental illness Father     Hypertension Mother     Heart failure Mother     Diabetes Mother     Prostate cancer Paternal Grandfather     Cancer Paternal Grandfather     Breast  cancer Paternal Grandmother     Diabetes Maternal Grandmother     Hypertension Maternal Grandmother      Prior to Admission medications    Medication Sig Start Date End Date Taking? Authorizing Provider   albuterol sulfate  (90 Base) MCG/ACT inhaler Inhale 2 puffs Every 4 (Four) Hours As Needed.   Yes Provider, MD Yeison   buPROPion XL (Wellbutrin XL) 150 MG 24 hr tablet Take 1 tablet by mouth Daily. 9/11/23  Yes Tamara Banks APRN     _______________________________________    Current contraception: Nexplanon  History of abnormal Pap smear: no  Family history of uterine or ovarian cancer: no  Family History of colon cancer/colon polyps: no  History of abnormal mammogram: no      The following portions of the patient's history were reviewed and updated as appropriate: allergies, current medications, past family history, past medical history, past social history, past surgical history, and problem list.        Pertinent items are noted in HPI.       Objective   Physical Exam  Vitals and nursing note reviewed. Exam conducted with a chaperone present.   Constitutional:       Appearance: Normal appearance.   HENT:      Head: Normocephalic and atraumatic.   Pulmonary:      Effort: Pulmonary effort is normal.   Chest:   Breasts:     Right: Normal.      Left: Normal.   Abdominal:      General: Abdomen is flat.      Palpations: Abdomen is soft.   Genitourinary:     Exam position: Lithotomy position.      Labia:         Right: No rash or lesion.         Left: No rash or lesion.       Vagina: Normal. No vaginal discharge or lesions.      Cervix: Normal. No lesion.      Uterus: Normal. Not tender.       Adnexa: Right adnexa normal and left adnexa normal.        Right: No mass or tenderness.          Left: No mass or tenderness.     Lymphadenopathy:      Upper Body:      Right upper body: No supraclavicular, axillary or pectoral adenopathy.      Left upper body: No supraclavicular, axillary or pectoral adenopathy.  "  Skin:     General: Skin is warm and dry.   Neurological:      Mental Status: She is alert and oriented to person, place, and time.   Psychiatric:         Mood and Affect: Mood normal.         /70   Ht 170.2 cm (67\")   Wt 106 kg (233 lb 12.8 oz)   LMP 2023 (Exact Date) Comment:  first day.  BMI 36.62 kg/m²    BP Readings from Last 3 Encounters:   23 120/70   23 134/72   06/10/23 128/72      Wt Readings from Last 3 Encounters:   23 106 kg (233 lb 12.8 oz)   23 110 kg (242 lb)   06/10/23 104 kg (230 lb)        BMI: Estimated body mass index is 36.62 kg/m² as calculated from the following:    Height as of this encounter: 170.2 cm (67\").    Weight as of this encounter: 106 kg (233 lb 12.8 oz).    As part of wellness and prevention, the following topics were discussed with the patient:  Encouraged self breast exam  Smoking cessation discussed.  Substance abuse/misuse discussed.  Sexual behavior/safe practices discussed.   Sexual transmitted disease prevention   Contraception discussed.   Dental health discussed  Mental health discussed.   Vaccinations/immunizations addressed.       Patient is a smoker.      Problem List   Meds  History  Prep for Surg   Imagin}    Assessment:  28 y.o. female  who presents for annual exam.  Diagnoses and all orders for this visit:    1. Encounter for annual physical examination excluding gynecological examination in a patient older than 17 years (Primary)  Comments:  Pap today  Would like Nexplanon removed > Phexxi sent to Pharm  No STI testing  Pt to get flu and COVID vaccines with PCP appt.    2. Encounter for initial prescription of other contraceptives  -     Lactic Ac-Citric Ac-Pot Bitart (Phexxi) 1.8-1-0.4 % gel; Insert 1 applicator into the vagina 1 (One) Time As Needed (Insert up to one hour prior to intercourse) for up to 10 doses.  Dispense: 10 applicator; Refill: 0    3. Screening for cervical cancer  -     IGP, Apt " HPV,rfx 16 / 18,45      Plan:  No follow-ups on file.    Carin Ryder MD  11/30/2023 15:40 EST

## 2023-12-06 LAB
CYTOLOGIST CVX/VAG CYTO: ABNORMAL
CYTOLOGY CVX/VAG DOC CYTO: ABNORMAL
CYTOLOGY CVX/VAG DOC THIN PREP: ABNORMAL
DX ICD CODE: ABNORMAL
DX ICD CODE: ABNORMAL
HIV 1 & 2 AB SER-IMP: ABNORMAL
HPV I/H RISK 4 DNA CVX QL PROBE+SIG AMP: POSITIVE
HPV16 DNA CVX QL PROBE+SIG AMP: NEGATIVE
HPV18+45 E6+E7 MRNA CVX QL NAA+PROBE: NEGATIVE
Lab: ABNORMAL
OTHER STN SPEC: ABNORMAL
PATH REPORT.COMMENTS IMP SPEC: ABNORMAL
PATHOLOGIST CVX/VAG CYTO: ABNORMAL
RECOM F/U CVX/VAG CYTO: ABNORMAL
STAT OF ADQ CVX/VAG CYTO-IMP: ABNORMAL

## 2023-12-21 ENCOUNTER — PROCEDURE VISIT (OUTPATIENT)
Dept: OBSTETRICS AND GYNECOLOGY | Age: 28
End: 2023-12-21
Payer: COMMERCIAL

## 2023-12-21 VITALS
SYSTOLIC BLOOD PRESSURE: 110 MMHG | WEIGHT: 233 LBS | BODY MASS INDEX: 36.57 KG/M2 | HEIGHT: 67 IN | DIASTOLIC BLOOD PRESSURE: 60 MMHG

## 2023-12-21 DIAGNOSIS — Z01.89 NORMAL COLPOSCOPY: ICD-10-CM

## 2023-12-21 DIAGNOSIS — R87.618 PAP SMEAR ABNORMALITY OF CERVIX/HUMAN PAPILLOMAVIRUS (HPV) POSITIVE: Primary | ICD-10-CM

## 2023-12-21 LAB
B-HCG UR QL: NEGATIVE
EXPIRATION DATE: NORMAL
INTERNAL NEGATIVE CONTROL: NORMAL
INTERNAL POSITIVE CONTROL: NORMAL
Lab: NORMAL

## 2023-12-21 NOTE — PROGRESS NOTES
[facility]   Obstetrics and Gynecology     2023      Patient:  Bettye Amador   MR#:[id number]    Office note    Chief Complaint   Patient presents with    Follow-up     C/o : Patient is coming in for colposcopy   Last pap 2023 smear was ASCUS, HPV positive       Subjective     History of Present Illness  28 y.o. female  presents for colposcopy. Recent pap was ASCUS, HPV +.         Patient Active Problem List   Diagnosis    Other sleep apnea    Attention deficit hyperactivity disorder (ADHD), combined type    Depression    Chronic left SI joint pain    Awaiting removal of contraceptive intrauterine device (IUD)    Mild intermittent asthma without complication    Nicotine abuse    Marijuana abuse    Vitamin D deficiency       Past Medical History:   Diagnosis Date    Asthma     Sleep apnea      History reviewed. No pertinent surgical history.  Obstetric History:  OB History          0    Para   0    Term   0       0    AB   0    Living   0         SAB   0    IAB   0    Ectopic   0    Molar   0    Multiple   0    Live Births   0               Menstrual History:     Patient's last menstrual period was 12/10/2023 (exact date).       The patient has never been pregnant.  Family History   Problem Relation Age of Onset    Mental illness Father     Hypertension Mother     Heart failure Mother     Diabetes Mother     Prostate cancer Paternal Grandfather     Cancer Paternal Grandfather     Breast cancer Paternal Grandmother     Diabetes Maternal Grandmother     Hypertension Maternal Grandmother      Social History     Tobacco Use    Smoking status: Every Day     Packs/day: 1     Types: Cigarettes    Smokeless tobacco: Never   Vaping Use    Vaping Use: Every day   Substance Use Topics    Alcohol use: Yes     Alcohol/week: 1.0 standard drink of alcohol     Types: 1 Glasses of wine per week     Comment: occ    Drug use: Never     Patient has no known allergies.    Current Outpatient  "Medications:     albuterol sulfate  (90 Base) MCG/ACT inhaler, Inhale 2 puffs Every 4 (Four) Hours As Needed., Disp: , Rfl:     buPROPion XL (Wellbutrin XL) 150 MG 24 hr tablet, Take 1 tablet by mouth Daily., Disp: 30 tablet, Rfl: 1    Lactic Ac-Citric Ac-Pot Bitart (Phexxi) 1.8-1-0.4 % gel, Insert 1 applicator into the vagina 1 (One) Time As Needed (Insert up to one hour prior to intercourse) for up to 10 doses., Disp: 10 applicator, Rfl: 0      Review of Systems   All other systems reviewed and are negative.      BP Readings from Last 3 Encounters:   12/21/23 110/60   11/30/23 120/70   09/11/23 134/72      Wt Readings from Last 3 Encounters:   12/21/23 106 kg (233 lb)   11/30/23 106 kg (233 lb 12.8 oz)   09/11/23 110 kg (242 lb)      BMI: Estimated body mass index is 36.49 kg/m² as calculated from the following:    Height as of this encounter: 170.2 cm (67\").    Weight as of this encounter: 106 kg (233 lb). BSA: Estimated body surface area is 2.16 meters squared as calculated from the following:    Height as of this encounter: 170.2 cm (67\").    Weight as of this encounter: 106 kg (233 lb).    Objective   Physical Exam    Colposcopy    Date/Time: 12/21/2023 8:59 AM    Performed by: Carin Ryder MD  Authorized by: Carin Ryder MD  Comments: Colposcopy Procedure Note    Indications: Recent pap smear showed ASCUS, HPV +    Procedure Details   The risks and benefits of the procedure were reviewed, and verbal informed consent was obtained.  Speculum was placed in vagina, and excellent visualization of cervix achieved.  Cervix was swabbed x 3 with acetic acid solution and then Lugol's solution.  Colposcopic exam revealed findings noted below.  Spirabrush sample was taken. Endocervical curettage was performed. Hemostasis was achieved.     Findings: complete squamocolumnar junction was identified, cervix showed no identifiable lesions     Specimens: endocervical curettings, spirabrush sample "     Complications: none, patient tolerated the procedure well    Plan:  Specimens sent to pathology  Will base treatment plan on these results  Patient instructed to take NSAIDs for pain control  No follow up appointment needed at this time, will call patient with results    Carin Ryder MD  12/21/23  08:59 EST              Assessment & Plan     Diagnoses and all orders for this visit:    1. Pap smear abnormality of cervix/human papillomavirus (HPV) positive (Primary)  Comments:  - No lesions on cervix, patient tolerated exam well.   - Will call with results.  Orders:  -     Colposcopy  -     Tissue Pathology Exam    2. Normal colposcopy  -     POC Pregnancy, Urine        No follow-ups on file.    Carin Ryder MD   12/21/2023 10:51 EST

## 2023-12-27 LAB
DX ICD CODE: NORMAL
PATH REPORT.FINAL DX SPEC: NORMAL
PATH REPORT.GROSS SPEC: NORMAL
PATH REPORT.SITE OF ORIGIN SPEC: NORMAL
PATHOLOGIST NAME: NORMAL
PAYMENT PROCEDURE: NORMAL

## 2024-01-06 ENCOUNTER — HOSPITAL ENCOUNTER (INPATIENT)
Facility: HOSPITAL | Age: 29
LOS: 1 days | Discharge: HOME | End: 2024-01-07
Attending: EMERGENCY MEDICINE | Admitting: INTERNAL MEDICINE
Payer: COMMERCIAL

## 2024-01-06 ENCOUNTER — APPOINTMENT (OUTPATIENT)
Dept: GENERAL RADIOLOGY | Facility: HOSPITAL | Age: 29
End: 2024-01-06
Payer: COMMERCIAL

## 2024-01-06 DIAGNOSIS — J45.51 SEVERE PERSISTENT ASTHMA WITH ACUTE EXACERBATION: Primary | ICD-10-CM

## 2024-01-06 DIAGNOSIS — M25.571 ACUTE RIGHT ANKLE PAIN: ICD-10-CM

## 2024-01-06 DIAGNOSIS — F17.200 SMOKER: ICD-10-CM

## 2024-01-06 DIAGNOSIS — J96.01 ACUTE HYPOXEMIC RESPIRATORY FAILURE: ICD-10-CM

## 2024-01-06 PROBLEM — J45.901 ACUTE ASTHMA EXACERBATION: Status: ACTIVE | Noted: 2024-01-06

## 2024-01-06 LAB
ALBUMIN SERPL-MCNC: 4 G/DL (ref 3.5–5.2)
ALBUMIN/GLOB SERPL: 1.2 G/DL
ALP SERPL-CCNC: 61 U/L (ref 39–117)
ALT SERPL W P-5'-P-CCNC: 10 U/L (ref 1–33)
ANION GAP SERPL CALCULATED.3IONS-SCNC: 16.4 MMOL/L (ref 5–15)
AST SERPL-CCNC: 19 U/L (ref 1–32)
BASOPHILS # BLD AUTO: 0.05 10*3/MM3 (ref 0–0.2)
BASOPHILS NFR BLD AUTO: 0.5 % (ref 0–1.5)
BILIRUB SERPL-MCNC: 0.5 MG/DL (ref 0–1.2)
BUN SERPL-MCNC: 9 MG/DL (ref 6–20)
BUN/CREAT SERPL: 12.7 (ref 7–25)
CALCIUM SPEC-SCNC: 9.3 MG/DL (ref 8.6–10.5)
CHLORIDE SERPL-SCNC: 104 MMOL/L (ref 98–107)
CO2 SERPL-SCNC: 17.6 MMOL/L (ref 22–29)
CREAT SERPL-MCNC: 0.71 MG/DL (ref 0.57–1)
DEPRECATED RDW RBC AUTO: 38.7 FL (ref 37–54)
EGFRCR SERPLBLD CKD-EPI 2021: 118.2 ML/MIN/1.73
EOSINOPHIL # BLD AUTO: 0.32 10*3/MM3 (ref 0–0.4)
EOSINOPHIL NFR BLD AUTO: 3.1 % (ref 0.3–6.2)
ERYTHROCYTE [DISTWIDTH] IN BLOOD BY AUTOMATED COUNT: 11.8 % (ref 12.3–15.4)
FLUAV RNA RESP QL NAA+PROBE: NOT DETECTED
FLUBV RNA RESP QL NAA+PROBE: NOT DETECTED
GLOBULIN UR ELPH-MCNC: 3.4 GM/DL
GLUCOSE SERPL-MCNC: 88 MG/DL (ref 65–99)
HCG SERPL QL: NEGATIVE
HCT VFR BLD AUTO: 40.6 % (ref 34–46.6)
HGB BLD-MCNC: 13.5 G/DL (ref 12–15.9)
IMM GRANULOCYTES # BLD AUTO: 0.03 10*3/MM3 (ref 0–0.05)
IMM GRANULOCYTES NFR BLD AUTO: 0.3 % (ref 0–0.5)
LYMPHOCYTES # BLD AUTO: 2.95 10*3/MM3 (ref 0.7–3.1)
LYMPHOCYTES NFR BLD AUTO: 28.2 % (ref 19.6–45.3)
MCH RBC QN AUTO: 30.2 PG (ref 26.6–33)
MCHC RBC AUTO-ENTMCNC: 33.3 G/DL (ref 31.5–35.7)
MCV RBC AUTO: 90.8 FL (ref 79–97)
MONOCYTES # BLD AUTO: 1.13 10*3/MM3 (ref 0.1–0.9)
MONOCYTES NFR BLD AUTO: 10.8 % (ref 5–12)
NEUTROPHILS NFR BLD AUTO: 5.97 10*3/MM3 (ref 1.7–7)
NEUTROPHILS NFR BLD AUTO: 57.1 % (ref 42.7–76)
NRBC BLD AUTO-RTO: 0 /100 WBC (ref 0–0.2)
PLATELET # BLD AUTO: 202 10*3/MM3 (ref 140–450)
PMV BLD AUTO: 10.6 FL (ref 6–12)
POTASSIUM SERPL-SCNC: 4.5 MMOL/L (ref 3.5–5.2)
PROT SERPL-MCNC: 7.4 G/DL (ref 6–8.5)
QT INTERVAL: 366 MS
QTC INTERVAL: 415 MS
RBC # BLD AUTO: 4.47 10*6/MM3 (ref 3.77–5.28)
RSV RNA NPH QL NAA+NON-PROBE: NOT DETECTED
SARS-COV-2 RNA RESP QL NAA+PROBE: NOT DETECTED
SODIUM SERPL-SCNC: 138 MMOL/L (ref 136–145)
URATE SERPL-MCNC: 5.5 MG/DL (ref 2.4–5.7)
WBC NRBC COR # BLD AUTO: 10.45 10*3/MM3 (ref 3.4–10.8)

## 2024-01-06 PROCEDURE — 84703 CHORIONIC GONADOTROPIN ASSAY: CPT | Performed by: EMERGENCY MEDICINE

## 2024-01-06 PROCEDURE — 93010 ELECTROCARDIOGRAM REPORT: CPT | Performed by: INTERNAL MEDICINE

## 2024-01-06 PROCEDURE — 84550 ASSAY OF BLOOD/URIC ACID: CPT | Performed by: INTERNAL MEDICINE

## 2024-01-06 PROCEDURE — 25010000002 METHYLPREDNISOLONE PER 125 MG: Performed by: INTERNAL MEDICINE

## 2024-01-06 PROCEDURE — 87637 SARSCOV2&INF A&B&RSV AMP PRB: CPT | Performed by: EMERGENCY MEDICINE

## 2024-01-06 PROCEDURE — 25010000002 SODIUM CHLORIDE 0.9 % WITH KCL 20 MEQ 20-0.9 MEQ/L-% SOLUTION: Performed by: INTERNAL MEDICINE

## 2024-01-06 PROCEDURE — 73610 X-RAY EXAM OF ANKLE: CPT

## 2024-01-06 PROCEDURE — 94799 UNLISTED PULMONARY SVC/PX: CPT

## 2024-01-06 PROCEDURE — 71045 X-RAY EXAM CHEST 1 VIEW: CPT

## 2024-01-06 PROCEDURE — 80053 COMPREHEN METABOLIC PANEL: CPT | Performed by: EMERGENCY MEDICINE

## 2024-01-06 PROCEDURE — 25010000002 ENOXAPARIN PER 10 MG: Performed by: INTERNAL MEDICINE

## 2024-01-06 PROCEDURE — 94664 DEMO&/EVAL PT USE INHALER: CPT

## 2024-01-06 PROCEDURE — 93005 ELECTROCARDIOGRAM TRACING: CPT | Performed by: EMERGENCY MEDICINE

## 2024-01-06 PROCEDURE — 85025 COMPLETE CBC W/AUTO DIFF WBC: CPT | Performed by: EMERGENCY MEDICINE

## 2024-01-06 PROCEDURE — 99285 EMERGENCY DEPT VISIT HI MDM: CPT

## 2024-01-06 PROCEDURE — 25010000002 METHYLPREDNISOLONE PER 125 MG: Performed by: EMERGENCY MEDICINE

## 2024-01-06 PROCEDURE — 94640 AIRWAY INHALATION TREATMENT: CPT

## 2024-01-06 PROCEDURE — 94761 N-INVAS EAR/PLS OXIMETRY MLT: CPT

## 2024-01-06 RX ORDER — ALBUTEROL SULFATE 2.5 MG/3ML
2.5 SOLUTION RESPIRATORY (INHALATION)
Status: COMPLETED | OUTPATIENT
Start: 2024-01-06 | End: 2024-01-06

## 2024-01-06 RX ORDER — BENZONATATE 100 MG/1
100 CAPSULE ORAL 3 TIMES DAILY PRN
Status: DISCONTINUED | OUTPATIENT
Start: 2024-01-06 | End: 2024-01-07 | Stop reason: HOSPADM

## 2024-01-06 RX ORDER — ACETAMINOPHEN 325 MG/1
650 TABLET ORAL EVERY 4 HOURS PRN
Status: DISCONTINUED | OUTPATIENT
Start: 2024-01-06 | End: 2024-01-07 | Stop reason: HOSPADM

## 2024-01-06 RX ORDER — BISACODYL 10 MG
10 SUPPOSITORY, RECTAL RECTAL DAILY PRN
Status: DISCONTINUED | OUTPATIENT
Start: 2024-01-06 | End: 2024-01-07 | Stop reason: HOSPADM

## 2024-01-06 RX ORDER — ENOXAPARIN SODIUM 100 MG/ML
40 INJECTION SUBCUTANEOUS DAILY
Status: DISCONTINUED | OUTPATIENT
Start: 2024-01-06 | End: 2024-01-07 | Stop reason: HOSPADM

## 2024-01-06 RX ORDER — METHYLPREDNISOLONE SODIUM SUCCINATE 125 MG/2ML
60 INJECTION, POWDER, LYOPHILIZED, FOR SOLUTION INTRAMUSCULAR; INTRAVENOUS EVERY 8 HOURS
Status: DISCONTINUED | OUTPATIENT
Start: 2024-01-06 | End: 2024-01-07 | Stop reason: HOSPADM

## 2024-01-06 RX ORDER — IPRATROPIUM BROMIDE AND ALBUTEROL SULFATE 2.5; .5 MG/3ML; MG/3ML
3 SOLUTION RESPIRATORY (INHALATION)
Status: DISCONTINUED | OUTPATIENT
Start: 2024-01-06 | End: 2024-01-07 | Stop reason: HOSPADM

## 2024-01-06 RX ORDER — NITROGLYCERIN 0.4 MG/1
0.4 TABLET SUBLINGUAL
Status: DISCONTINUED | OUTPATIENT
Start: 2024-01-06 | End: 2024-01-07 | Stop reason: HOSPADM

## 2024-01-06 RX ORDER — SODIUM CHLORIDE 0.9 % (FLUSH) 0.9 %
10 SYRINGE (ML) INJECTION EVERY 12 HOURS SCHEDULED
Status: DISCONTINUED | OUTPATIENT
Start: 2024-01-06 | End: 2024-01-07 | Stop reason: HOSPADM

## 2024-01-06 RX ORDER — SODIUM CHLORIDE 0.9 % (FLUSH) 0.9 %
10 SYRINGE (ML) INJECTION AS NEEDED
Status: DISCONTINUED | OUTPATIENT
Start: 2024-01-06 | End: 2024-01-07 | Stop reason: HOSPADM

## 2024-01-06 RX ORDER — SODIUM CHLORIDE 9 MG/ML
40 INJECTION, SOLUTION INTRAVENOUS AS NEEDED
Status: DISCONTINUED | OUTPATIENT
Start: 2024-01-06 | End: 2024-01-07 | Stop reason: HOSPADM

## 2024-01-06 RX ORDER — METHYLPREDNISOLONE SODIUM SUCCINATE 125 MG/2ML
125 INJECTION, POWDER, LYOPHILIZED, FOR SOLUTION INTRAMUSCULAR; INTRAVENOUS ONCE
Status: COMPLETED | OUTPATIENT
Start: 2024-01-06 | End: 2024-01-06

## 2024-01-06 RX ORDER — AMOXICILLIN 250 MG
2 CAPSULE ORAL 2 TIMES DAILY
Status: DISCONTINUED | OUTPATIENT
Start: 2024-01-06 | End: 2024-01-07 | Stop reason: HOSPADM

## 2024-01-06 RX ORDER — BISACODYL 5 MG/1
5 TABLET, DELAYED RELEASE ORAL DAILY PRN
Status: DISCONTINUED | OUTPATIENT
Start: 2024-01-06 | End: 2024-01-07 | Stop reason: HOSPADM

## 2024-01-06 RX ORDER — HYDROCODONE BITARTRATE AND ACETAMINOPHEN 5; 325 MG/1; MG/1
1 TABLET ORAL EVERY 4 HOURS PRN
Status: DISCONTINUED | OUTPATIENT
Start: 2024-01-06 | End: 2024-01-07 | Stop reason: HOSPADM

## 2024-01-06 RX ORDER — SODIUM CHLORIDE AND POTASSIUM CHLORIDE 150; 900 MG/100ML; MG/100ML
100 INJECTION, SOLUTION INTRAVENOUS CONTINUOUS
Status: DISCONTINUED | OUTPATIENT
Start: 2024-01-06 | End: 2024-01-07 | Stop reason: HOSPADM

## 2024-01-06 RX ORDER — GUAIFENESIN 600 MG/1
1200 TABLET, EXTENDED RELEASE ORAL EVERY 12 HOURS SCHEDULED
Status: DISCONTINUED | OUTPATIENT
Start: 2024-01-06 | End: 2024-01-07 | Stop reason: HOSPADM

## 2024-01-06 RX ORDER — POLYETHYLENE GLYCOL 3350 17 G/17G
17 POWDER, FOR SOLUTION ORAL DAILY PRN
Status: DISCONTINUED | OUTPATIENT
Start: 2024-01-06 | End: 2024-01-07 | Stop reason: HOSPADM

## 2024-01-06 RX ADMIN — Medication 10 ML: at 20:25

## 2024-01-06 RX ADMIN — ALBUTEROL SULFATE 2.5 MG: 2.5 SOLUTION RESPIRATORY (INHALATION) at 10:15

## 2024-01-06 RX ADMIN — ENOXAPARIN SODIUM 40 MG: 100 INJECTION SUBCUTANEOUS at 17:48

## 2024-01-06 RX ADMIN — METHYLPREDNISOLONE SODIUM SUCCINATE 60 MG: 125 INJECTION, POWDER, FOR SOLUTION INTRAMUSCULAR; INTRAVENOUS at 17:48

## 2024-01-06 RX ADMIN — IPRATROPIUM BROMIDE AND ALBUTEROL SULFATE 3 ML: 2.5; .5 SOLUTION RESPIRATORY (INHALATION) at 21:24

## 2024-01-06 RX ADMIN — METHYLPREDNISOLONE SODIUM SUCCINATE 125 MG: 125 INJECTION, POWDER, FOR SOLUTION INTRAMUSCULAR; INTRAVENOUS at 10:16

## 2024-01-06 RX ADMIN — HYDROCODONE BITARTRATE AND ACETAMINOPHEN 1 TABLET: 5; 325 TABLET ORAL at 23:26

## 2024-01-06 RX ADMIN — ALBUTEROL SULFATE 2.5 MG: 2.5 SOLUTION RESPIRATORY (INHALATION) at 10:00

## 2024-01-06 RX ADMIN — GUAIFENESIN 1200 MG: 600 TABLET, EXTENDED RELEASE ORAL at 20:25

## 2024-01-06 RX ADMIN — ALBUTEROL SULFATE 2.5 MG: 2.5 SOLUTION RESPIRATORY (INHALATION) at 10:30

## 2024-01-06 RX ADMIN — POTASSIUM CHLORIDE AND SODIUM CHLORIDE 100 ML/HR: 900; 150 INJECTION, SOLUTION INTRAVENOUS at 17:49

## 2024-01-06 NOTE — ED NOTES
"Nursing report ED to floor  Bettye Amador  28 y.o.  female    HPI :   Chief Complaint   Patient presents with    Ankle Pain       Admitting doctor:   Stevie Toscano MD    Admitting diagnosis:   The primary encounter diagnosis was Severe persistent asthma with acute exacerbation. Diagnoses of Acute hypoxemic respiratory failure, Acute right ankle pain, and Smoker were also pertinent to this visit.    Code status:   Current Code Status       Date Active Code Status Order ID Comments User Context       Not on file            Allergies:   Patient has no known allergies.    Isolation:   Enhanced Droplet/Contact     Intake and Output  No intake or output data in the 24 hours ending 01/06/24 1339    Weight:       01/06/24  0947   Weight: 107 kg (235 lb)       Most recent vitals:   Vitals:    01/06/24 0947 01/06/24 1000 01/06/24 1010 01/06/24 1146   BP:   140/72    BP Location:   Left arm    Patient Position:   Sitting    Pulse: 105 89     Resp: 16 16     Temp: 97.9 °F (36.6 °C)      SpO2: 99% 94% 92% 97%   Weight: 107 kg (235 lb)      Height: 170.2 cm (67\")          Active LDAs/IV Access:   Lines, Drains & Airways       Active LDAs       Name Placement date Placement time Site Days    Peripheral IV 01/06/24 1010 Right Antecubital 01/06/24  1010  Antecubital  less than 1                    Labs (abnormal labs have a star):   Labs Reviewed   COMPREHENSIVE METABOLIC PANEL - Abnormal; Notable for the following components:       Result Value    CO2 17.6 (*)     Anion Gap 16.4 (*)     All other components within normal limits    Narrative:     GFR Normal >60  Chronic Kidney Disease <60  Kidney Failure <15     CBC WITH AUTO DIFFERENTIAL - Abnormal; Notable for the following components:    RDW 11.8 (*)     Monocytes, Absolute 1.13 (*)     All other components within normal limits   COVID-19/FLUA&B/RSV, NP SWAB IN TRANSPORT MEDIA 1 HR TAT - Normal    Narrative:     Fact sheet for providers: " https://www.fda.gov/media/117148/download    Fact sheet for patients: https://www.fda.gov/media/623727/download    Test performed by PCR.   HCG, SERUM, QUALITATIVE - Normal   CBC AND DIFFERENTIAL    Narrative:     The following orders were created for panel order CBC & Differential.  Procedure                               Abnormality         Status                     ---------                               -----------         ------                     CBC Auto Differential[[id number]]        Abnormal            Final result                 Please view results for these tests on the individual orders.       EKG:   ECG 12 Lead Dyspnea   Preliminary Result   HEART RATE= 77  bpm   RR Interval= 779  ms   [place] Interval= 172  ms   P Horizontal Axis= 44  deg   P Front Axis= 43  deg   QRSD Interval= 96  ms   QT Interval= 366  ms   QTcB= 415  ms   QRS Axis= 55  deg   T Wave Axis= 54  deg   - NORMAL ECG -   Sinus rhythm   Electronically Signed By:    Date and Time of Study: 2024-01-06 10:07:39          Meds given in ED:   Medications   albuterol (PROVENTIL) nebulizer solution 0.083% 2.5 mg/3mL (2.5 mg Nebulization Given 1/6/24 1030)   methylPREDNISolone sodium succinate (SOLU-Medrol) injection 125 mg (125 mg Intravenous Given 1/6/24 1016)       Imaging results:  No radiology results for the last day    Ambulatory status:   - standby    Social issues:   Social History     Socioeconomic History    Marital status: Single   Tobacco Use    Smoking status: Every Day     Packs/day: 1     Types: Cigarettes    Smokeless tobacco: Never   Vaping Use    Vaping Use: Every day   Substance and Sexual Activity    Alcohol use: Yes     Alcohol/week: 1.0 standard drink of alcohol     Types: 1 Glasses of wine per week     Comment: occ    Drug use: Never    Sexual activity: Not Currently     Partners: Male     Birth control/protection: Nexplanon       NIH Stroke Scale:       Tamara Gallego RN  01/06/24 13:39 EST

## 2024-01-06 NOTE — ED PROVIDER NOTES
EMERGENCY DEPARTMENT ENCOUNTER    Room Number:  39/39  PCP: Tamara Banks APRN  Independent Historians: Patient    HPI:  Chief Complaint: had concerns including Ankle Pain.  As well as shortness of breath and cough    A complete HPI/ROS/PMH/PSH/SH/FH are unobtainable due to: None    Chronic or social conditions impacting patient care (Social Determinants of Health): None      Context: Bettye Amador is a 28 y.o. female with a medical history of asthma who presents to the ED c/o acute shortness of breath and cough as well as ankle pain.  The patient reports for the last months she has had a cough.  She states she has a history of asthma.  She states she has been using her rescue inhaler daily.  She reports she has a severe cough as well as shortness of breath that is between mild and severe.  She reports she feels tightness in her chest.  She is not on steroids.  She denies pregnancy.  She denies chest pain.  She denies syncope.  She denies fevers.  She states her cough is productive.  Additionally she reports developing right ankle pain 2 days ago.  She denies any specific injury.  She denies any prior similar episodes.  She reports point tenderness to her right ankle at her Achilles medially.  She denies any other other foot or knee pain.  She is a smoker.        Review of prior external notes (non-ED) -and- Review of prior external test results outside of this encounter:     laboratory evaluation 9/11/2023 shows normal CMP, normal CBC    Prescription drug monitoring program review:         PAST MEDICAL HISTORY  Active Ambulatory Problems     Diagnosis Date Noted    Other sleep apnea 08/01/2022    Attention deficit hyperactivity disorder (ADHD), combined type 08/02/2022    Depression 08/02/2022    Chronic left SI joint pain 08/02/2022    Awaiting removal of contraceptive intrauterine device (IUD) 08/02/2022    Mild intermittent asthma without complication 08/02/2022    Nicotine abuse 08/02/2022    Marijuana abuse  08/02/2022    Vitamin D deficiency 08/10/2022     Resolved Ambulatory Problems     Diagnosis Date Noted    No Resolved Ambulatory Problems     Past Medical History:   Diagnosis Date    Asthma     Sleep apnea          PAST SURGICAL HISTORY  History reviewed. No pertinent surgical history.      FAMILY HISTORY  Family History   Problem Relation Age of Onset    Mental illness Father     Hypertension Mother     Heart failure Mother     Diabetes Mother     Prostate cancer Paternal Grandfather     Cancer Paternal Grandfather     Breast cancer Paternal Grandmother     Diabetes Maternal Grandmother     Hypertension Maternal Grandmother          SOCIAL HISTORY  Social History     Socioeconomic History    Marital status: Single   Tobacco Use    Smoking status: Every Day     Packs/day: 1     Types: Cigarettes    Smokeless tobacco: Never   Vaping Use    Vaping Use: Every day   Substance and Sexual Activity    Alcohol use: Yes     Alcohol/week: 1.0 standard drink of alcohol     Types: 1 Glasses of wine per week     Comment: occ    Drug use: Never    Sexual activity: Not Currently     Partners: Male     Birth control/protection: Nexplanon         ALLERGIES  Patient has no known allergies.        REVIEW OF SYSTEMS  Review of Systems  Included in HPI  All systems reviewed and negative except for those discussed in HPI.      PHYSICAL EXAM    I have reviewed the triage vital signs and nursing notes.    ED Triage Vitals [01/06/24 0947]   Temp Heart Rate Resp BP SpO2   97.9 °F (36.6 °C) 105 16 -- 99 %      Temp src Heart Rate Source Patient Position BP Location FiO2 (%)   -- -- -- -- --       Physical Exam  GENERAL: Awake, alert, coughing  SKIN: Warm, dry  HENT: Normocephalic, atraumatic  EYES: no scleral icterus  CV: regular rhythm, regular rate  RESPIRATORY: normal effort, lungs with wheezes bilaterally  ABDOMEN: soft, nontender, nondistended  MUSCULOSKELETAL: no deformity.  No calf tenderness or swelling.  She has point tenderness to  her medial right Achilles tendon.  No open wounds.  No deformity.  Pulses are strong.  Normal sensation and motor.  NEURO: alert, moves all extremities, follows commands      NIH:                                                         PPE: The patient wore: mask and I wore: gloves and N95 mask throughout the entire patient encounter.    LAB RESULTS  Recent Results (from the past 24 hour(s))   ECG 12 Lead Dyspnea    Collection Time: 01/06/24 10:07 AM   Result Value Ref Range    QT Interval 366 ms    QTC Interval 415 ms   Comprehensive Metabolic Panel    Collection Time: 01/06/24 10:15 AM    Specimen: Blood   Result Value Ref Range    Glucose 88 65 - 99 mg/dL    BUN 9 6 - 20 mg/dL    Creatinine 0.71 0.57 - 1.00 mg/dL    Sodium 138 136 - 145 mmol/L    Potassium 4.5 3.5 - 5.2 mmol/L    Chloride 104 98 - 107 mmol/L    CO2 17.6 (L) 22.0 - 29.0 mmol/L    Calcium 9.3 8.6 - 10.5 mg/dL    Total Protein 7.4 6.0 - 8.5 g/dL    Albumin 4.0 3.5 - 5.2 g/dL    ALT (SGPT) 10 1 - 33 U/L    AST (SGOT) 19 1 - 32 U/L    Alkaline Phosphatase 61 39 - 117 U/L    Total Bilirubin 0.5 0.0 - 1.2 mg/dL    Globulin 3.4 gm/dL    A/G Ratio 1.2 g/dL    BUN/Creatinine Ratio 12.7 7.0 - 25.0    Anion Gap 16.4 (H) 5.0 - 15.0 mmol/L    eGFR 118.2 >60.0 mL/min/1.73   hCG, Serum, Qualitative    Collection Time: 01/06/24 10:15 AM    Specimen: Blood   Result Value Ref Range    HCG Qualitative Negative Negative   CBC Auto Differential    Collection Time: 01/06/24 10:15 AM    Specimen: Blood   Result Value Ref Range    WBC 10.45 3.40 - 10.80 10*3/mm3    RBC 4.47 3.77 - 5.28 10*6/mm3    Hemoglobin 13.5 12.0 - 15.9 g/dL    Hematocrit 40.6 34.0 - 46.6 %    MCV 90.8 79.0 - 97.0 fL    MCH 30.2 26.6 - 33.0 pg    MCHC 33.3 31.5 - 35.7 g/dL    RDW 11.8 (L) 12.3 - 15.4 %    RDW-SD 38.7 37.0 - 54.0 fl    MPV 10.6 6.0 - 12.0 fL    Platelets 202 140 - 450 10*3/mm3    Neutrophil % 57.1 42.7 - 76.0 %    Lymphocyte % 28.2 19.6 - 45.3 %    Monocyte % 10.8 5.0 - 12.0 %     Eosinophil % 3.1 0.3 - 6.2 %    Basophil % 0.5 0.0 - 1.5 %    Immature Grans % 0.3 0.0 - 0.5 %    Neutrophils, Absolute 5.97 1.70 - 7.00 10*3/mm3    Lymphocytes, Absolute 2.95 0.70 - 3.10 10*3/mm3    Monocytes, Absolute 1.13 (H) 0.10 - 0.90 10*3/mm3    Eosinophils, Absolute 0.32 0.00 - 0.40 10*3/mm3    Basophils, Absolute 0.05 0.00 - 0.20 10*3/mm3    Immature Grans, Absolute 0.03 0.00 - 0.05 10*3/mm3    nRBC 0.0 0.0 - 0.2 /100 WBC   COVID-19, FLU A/B, RSV PCR 1 HR TAT - Swab, Nasopharynx    Collection Time: 01/06/24 10:16 AM    Specimen: Nasopharynx; Swab   Result Value Ref Range    COVID19 Not Detected Not Detected - Ref. Range    Influenza A PCR Not Detected Not Detected    Influenza B PCR Not Detected Not Detected    RSV, PCR Not Detected Not Detected         RADIOLOGY  XR Ankle 3+ View Right    Result Date: 1/6/2024  3 VIEW RIGHT ANKLE  HISTORY: Acute right ankle pain.  FINDINGS: There is no evidence of acute fracture and the ankle mortise is well preserved.  This report was finalized on 1/6/2024 10:08 AM by Dr. Dru Dong M.D on Workstation: [id number]      XR Chest 1 View    Result Date: 1/6/2024  ONE-VIEW PORTABLE CHEST  HISTORY: Persistent cough.  FINDINGS: The lungs are well expanded and clear and the heart and hilar structures are normal. There is no acute disease.  This report was finalized on 1/6/2024 10:08 AM by Dr. Dru Dong M.D on Workstation: [id number]         MEDICATIONS GIVEN IN ER  Medications   albuterol (PROVENTIL) nebulizer solution 0.083% 2.5 mg/3mL (2.5 mg Nebulization Given 1/6/24 1030)   methylPREDNISolone sodium succinate (SOLU-Medrol) injection 125 mg (125 mg Intravenous Given 1/6/24 1016)         ORDERS PLACED DURING THIS VISIT:  Orders Placed This Encounter   Procedures    COVID-19, FLU A/B, RSV PCR 1 HR TAT - Swab, Nasopharynx    XR Chest 1 View    XR Ankle 3+ View Right    Comprehensive Metabolic Panel    hCG, Serum, Qualitative    CBC Auto Differential    [facility] (on-call MD unless  specified) Details    Pulse Oximetry While Ambulating    ECG 12 Lead Dyspnea    Inpatient Admission    CBC & Differential         OUTPATIENT MEDICATION MANAGEMENT:  No current Epic-ordered facility-administered medications on file.     Current Outpatient Medications Ordered in Epic   Medication Sig Dispense Refill    albuterol sulfate  (90 Base) MCG/ACT inhaler Inhale 2 puffs Every 4 (Four) Hours As Needed.      buPROPion XL (Wellbutrin XL) 150 MG 24 hr tablet Take 1 tablet by mouth Daily. 30 tablet 1    Lactic Ac-Citric Ac-Pot Bitart (Phexxi) 1.8-1-0.4 % gel Insert 1 applicator into the vagina 1 (One) Time As Needed (Insert up to one hour prior to intercourse) for up to 10 doses. 10 applicator 0         PROCEDURES  Procedures        Critical care provider statement:    Critical care time (minutes): 30-74.   Critical care time was exclusive of:  Separately billable procedures and treating other patients   Critical care was necessary to treat or prevent imminent or life-threatening deterioration of the following conditions:  Respiratory Failure   Critical care was time spent personally by me on the following activities:  Development of treatment plan with patient or surrogate, discussions with consultants, evaluation of patient's response to treatment, examination of patient, obtaining history from patient or surrogate, ordering and performing treatments and interventions, ordering and review of laboratory studies, ordering and review of radiographic studies, pulse oximetry, re-evaluation of patient's condition and review of old charts. Critical Care indicators: Asthma, multiple treatments with more risk       PROGRESS, DATA ANALYSIS, CONSULTS, AND MEDICAL DECISION MAKING  All labs have been independently interpreted by me.  All radiology studies have been reviewed by me. All EKG's have been independently viewed and interpreted by me.  Discussion below represents my analysis of pertinent findings related to  patient's condition, differential diagnosis, treatment plan and final disposition.    Differential diagnosis includes but is not limited to pneumonia, hypoxia, viral syndrome, COVID, flu, ankle sprain, gout, DVT, PE.    Clinical Scores:                 ED Course as of 01/06/24 1216   Sat Jan 06, 2024   1008 XR Chest 1 View  My independent interpretation of the chest x-ray is no dense consolidation [TR]   1008 EKG          EKG time: 1007  Rhythm/Rate: Normal sinus, rate 77  P waves and MI: Normal P, Normal MI  QRS, axis: Narrow QRS, Normal axis  ST and T waves: No acute changes    Independently Interpreted by me  Prior EKG available for comparison   [TR]   1118 I reviewed the workup and findings with the patient at the bedside.  Answered all questions.  She reports she is breathing much better.  She does have some coarse breath sounds bilaterally emanating from her posterior pharynx.  She does have a rare wheeze.  Her EKG is normal.  Her chest x-ray is clear.  Her COVID and flu testing is negative.  Plan to ambulate her and determine oxygen saturation.  If her oxygen saturation drops significantly she will need admission for management of acute asthma exacerbation.  If her oxygenation stays normal then plan discharge on steroids as well as nebulizer.  She has had 3 total nebulizer treatments here as well as steroids.  She is agreeable to the plan. [TR]   1129 O2 sats 91% with ambulation.  At rest 88%. [TR]   1132 Plan admission.  She is agreeable. [TR]   1143 CO2(!): 17.6 [TR]   1214 Discussing with Dr. Toscano with A.  He agrees to admit. [TR]      ED Course User Index  [TR] Ilan Nolasco MD                   AS OF 12:16 EST VITALS:    BP - 140/72  HR - 89  TEMP - 97.9 °F (36.6 °C)  O2 SATS - 97%      COMPLEXITY OF CARE  Number / Complexity of Problems Addressed at this Encounter:   Problems addressed this visit: Acute hypoxemic respiratory failure, acute asthma exacerbation, acute right ankle injury  Factors  that increase risk in this patient: History of smoking, hypoxia  Amount and/or Complexity of Data to be Reviewed and Analyzed:   Risk of Complications and/or Morbidity or Mortality of Patient Management:         DIAGNOSIS  Final diagnoses:   Severe persistent asthma with acute exacerbation   Acute hypoxemic respiratory failure   Acute right ankle pain   Smoker         DISPOSITION  ED Disposition       ED Disposition   Decision to Admit    Condition   --    Comment   Level of Care: Telemetry [5]   Diagnosis: Acute asthma exacerbation [[id number]]   Admitting Physician: BRITTANY BLEVINS [[id number]]   Attending Physician: BRITTANY BLEVINS [[id number]]   Certification: I Certify That Inpatient Hospital Services Are Medically Necessary For Greater Than 2 Midnights                  Please note that portions of this document were completed with a voice recognition program.    Note Disclaimer: At [facility], we believe that sharing information builds trust and better relationships. You are receiving this note because you recently visited [facility]. It is possible you will see health information before a provider has talked with you about it. This kind of information can be easy to misunderstand. To help you fully understand what it means for your health, we urge you to discuss this note with your provider.         Ilan Nolasco MD  01/06/24 0413

## 2024-01-06 NOTE — PROGRESS NOTES
"[facility] Clinical Pharmacy Services: Enoxaparin Consult    Bettye Amador has a pharmacy consult to dose prophylactic enoxaparin per Dr. Toscano's request.     Indication: VTE Prophylaxis  Home Anticoagulation: n/a     Relevant clinical data and objective history reviewed:  28 y.o. female 170.2 cm (67\") 107 kg (235 lb)   Body mass index is 36.81 kg/m².   Results from last 7 days   Lab Units 01/06/24  1015   PLATELETS 10*3/mm3 202     Estimated Creatinine Clearance: 147.3 mL/min (by C-G formula based on SCr of 0.71 mg/dL).    Assessment/Plan    Will start patient on 40mg subcutaneous every 24 hours, adjusted for renal function. Consult order will be discontinued but pharmacy will continue to follow.     Isaías Nolasco, PharmD, BCPS, BCOP  Clinical Staff Pharmacist    "

## 2024-01-06 NOTE — H&P
Patient Name:  Bettye Amador  YOB: 1995  MRN:  [id number]  Admit Date:  1/6/2024  Patient Care Team:  Tamara Banks APRN as PCP - General (Family Medicine)      Subjective   History Present Illness     Chief Complaint   Patient presents with    Ankle Pain       Patient is a 28-year-old female with known history of asthma presented to the emergency room with complaints of right ankle pain mainly around the Achilles tendon area.  States and she has difficulty bearing weight/flexing ankle/pain with internal and external rotation.  Imaging studies with no evidence of any acute findings and there is no inflammation of the joint.  In the interim she has also started complaining of cough along with wheezes(history of asthma) and her O2 saturations low 90s and high 80s and given the above she is being hospitalized.  Chest x-ray with no evidence of any infiltrate and tested negative for COVID/flu/RSV.  At the time of my evaluation patient does appear comfortable and not in any major distress.  Not using any accessory muscles.      Review of Systems   A 12 system review has been performed and are negative other than mentioned in the H&P    Personal History     Past Medical History:   Diagnosis Date    Asthma     Sleep apnea      History reviewed. No pertinent surgical history.  Family History   Problem Relation Age of Onset    Mental illness Father     Hypertension Mother     Heart failure Mother     Diabetes Mother     Prostate cancer Paternal Grandfather     Cancer Paternal Grandfather     Breast cancer Paternal Grandmother     Diabetes Maternal Grandmother     Hypertension Maternal Grandmother      Social History     Tobacco Use    Smoking status: Every Day     Packs/day: 1     Types: Cigarettes    Smokeless tobacco: Never   Vaping Use    Vaping Use: Every day   Substance Use Topics    Alcohol use: Yes     Alcohol/week: 1.0 standard drink of alcohol     Types: 1 Glasses of wine per week     Comment: occ     Drug use: Never     No current facility-administered medications on file prior to encounter.     Current Outpatient Medications on File Prior to Encounter   Medication Sig Dispense Refill    albuterol sulfate  (90 Base) MCG/ACT inhaler Inhale 2 puffs Every 4 (Four) Hours As Needed.      buPROPion XL (Wellbutrin XL) 150 MG 24 hr tablet Take 1 tablet by mouth Daily. 30 tablet 1    Lactic Ac-Citric Ac-Pot Bitart (Phexxi) 1.8-1-0.4 % gel Insert 1 applicator into the vagina 1 (One) Time As Needed (Insert up to one hour prior to intercourse) for up to 10 doses. 10 applicator 0     No Known Allergies    Objective    Objective     Vital Signs  Temp:  [97.9 °F (36.6 °C)-98.1 °F (36.7 °C)] 98.1 °F (36.7 °C)  Heart Rate:  [] 83  Resp:  [16-18] 18  BP: (123-140)/(72-86) 123/86  SpO2:  [92 %-99 %] 94 %  on  Flow (L/min):  [2] 2;   Device (Oxygen Therapy): room air  Body mass index is 36.81 kg/m².    Physical Exam  HEENT: PERRLA, extraocular movements intact, Scleras no icterus  Neck: Supple, no JVD  Cardiovascular: Regular rate and rhythm with normal S1 and S2  Respiratory: Diminished breath sounds with mild wheezes  GI: Soft, nontender, bowel sounds present  Extremities: No edema, palpable pulses  Neurologic: Grossly nonfocal, no facial asymmetry  Skin: Warm and dry with no additional rashes    Results Review:  I reviewed the patient's new clinical results.  I reviewed the patient's new imaging results and agree with the interpretation.  I reviewed the patient's other test results and agree with the interpretation  I personally viewed and interpreted the patient's EKG/Telemetry data  Discussed with ED provider.    Lab Results (last 24 hours)       Procedure Component Value Units Date/Time    CBC & Differential [[id number]]  (Abnormal) Collected: 01/06/24 1015    Specimen: Blood Updated: 01/06/24 1032    Narrative:      The following orders were created for panel order CBC & Differential.  Procedure                                Abnormality         Status                     ---------                               -----------         ------                     CBC Auto Differential[[id number]]        Abnormal            Final result                 Please view results for these tests on the individual orders.    Comprehensive Metabolic Panel [[id number]]  (Abnormal) Collected: 01/06/24 1015    Specimen: Blood Updated: 01/06/24 1055     Glucose 88 mg/dL      BUN 9 mg/dL      Creatinine 0.71 mg/dL      Sodium 138 mmol/L      Potassium 4.5 mmol/L      Comment: Specimen hemolyzed.  Result may be falsely elevated.        Chloride 104 mmol/L      CO2 17.6 mmol/L      Calcium 9.3 mg/dL      Total Protein 7.4 g/dL      Albumin 4.0 g/dL      ALT (SGPT) 10 U/L      Comment: Specimen hemolyzed.  Result may  be falsely elevated.        AST (SGOT) 19 U/L      Comment: Specimen hemolyzed.  Result may be falsely elevated.        Alkaline Phosphatase 61 U/L      Total Bilirubin 0.5 mg/dL      Globulin 3.4 gm/dL      A/G Ratio 1.2 g/dL      BUN/Creatinine Ratio 12.7     Anion Gap 16.4 mmol/L      eGFR 118.2 mL/min/1.73     Narrative:      GFR Normal >60  Chronic Kidney Disease <60  Kidney Failure <15      hCG, Serum, Qualitative [[id number]]  (Normal) Collected: 01/06/24 1015    Specimen: Blood Updated: 01/06/24 1050     HCG Qualitative Negative    CBC Auto Differential [[id number]]  (Abnormal) Collected: 01/06/24 1015    Specimen: Blood Updated: 01/06/24 1032     WBC 10.45 10*3/mm3      RBC 4.47 10*6/mm3      Hemoglobin 13.5 g/dL      Hematocrit 40.6 %      MCV 90.8 fL      MCH 30.2 pg      MCHC 33.3 g/dL      RDW 11.8 %      RDW-SD 38.7 fl      MPV 10.6 fL      Platelets 202 10*3/mm3      Neutrophil % 57.1 %      Lymphocyte % 28.2 %      Monocyte % 10.8 %      Eosinophil % 3.1 %      Basophil % 0.5 %      Immature Grans % 0.3 %      Neutrophils, Absolute 5.97 10*3/mm3      Lymphocytes, Absolute 2.95 10*3/mm3      Monocytes, Absolute 1.13  10*3/mm3      Eosinophils, Absolute 0.32 10*3/mm3      Basophils, Absolute 0.05 10*3/mm3      Immature Grans, Absolute 0.03 10*3/mm3      nRBC 0.0 /100 WBC     COVID-19, FLU A/B, RSV PCR 1 HR TAT - Swab, Nasopharynx [[id number]]  (Normal) Collected: 01/06/24 1016    Specimen: Swab from Nasopharynx Updated: 01/06/24 1105     COVID19 Not Detected     Influenza A PCR Not Detected     Influenza B PCR Not Detected     RSV, PCR Not Detected    Narrative:      Fact sheet for providers: https://www.fda.gov/media/645572/download    Fact sheet for patients: https://www.fda.gov/media/853594/download    Test performed by PCR.            Imaging Results (Last 24 Hours)       Procedure Component Value Units Date/Time    XR Ankle 3+ View Right [[id number]] Collected: 01/06/24 1008     Updated: 01/06/24 1012    Narrative:      3 VIEW RIGHT ANKLE     HISTORY: Acute right ankle pain.     FINDINGS: There is no evidence of acute fracture and the ankle mortise  is well preserved.     This report was finalized on 1/6/2024 10:08 AM by Dr. Dru Dong M.D  on Workstation: [id number]       XR Chest 1 View [[id number]] Collected: 01/06/24 1007     Updated: 01/06/24 1011    Narrative:      ONE-VIEW PORTABLE CHEST     HISTORY: Persistent cough.     FINDINGS: The lungs are well expanded and clear and the heart and hilar  structures are normal. There is no acute disease.     This report was finalized on 1/6/2024 10:08 AM by Dr. Dru Dong M.D  on Workstation: [id number]                   ECG 12 Lead Dyspnea   Preliminary Result   HEART RATE= 77  bpm   RR Interval= 779  ms   SD Interval= 172  ms   P Horizontal Axis= 44  deg   P Front Axis= 43  deg   QRSD Interval= 96  ms   QT Interval= 366  ms   QTcB= 415  ms   QRS Axis= 55  deg   T Wave Axis= 54  deg   - NORMAL ECG -   Sinus rhythm   Electronically Signed By:    Date and Time of Study: 2024-01-06 10:07:39           Assessment/Plan     Active Hospital Problems    Diagnosis  POA    **Acute asthma  exacerbation [J45.901]  Yes      Resolved Hospital Problems   No resolved problems to display.       1.Acute asthma exacerbation, she will be placed on nebulizers as well as steroids.  No need for any antibiotics at this point.  If she continues to do well hopefully home tomorrow.  Anticipate no oxygen needs upon discharge.  2.  Right ankle pain, uric acid level will be checked an x-ray with no acute fractures.  No trauma per patient's report.       Stevie Toscano MD  [facility]  01/06/24  17:00 EST

## 2024-01-07 ENCOUNTER — READMISSION MANAGEMENT (OUTPATIENT)
Dept: CALL CENTER | Facility: HOSPITAL | Age: 29
End: 2024-01-07
Payer: COMMERCIAL

## 2024-01-07 VITALS
DIASTOLIC BLOOD PRESSURE: 72 MMHG | RESPIRATION RATE: 20 BRPM | HEIGHT: 67 IN | OXYGEN SATURATION: 100 % | TEMPERATURE: 98.2 F | WEIGHT: 235 LBS | HEART RATE: 84 BPM | SYSTOLIC BLOOD PRESSURE: 134 MMHG | BODY MASS INDEX: 36.88 KG/M2

## 2024-01-07 LAB
ANION GAP SERPL CALCULATED.3IONS-SCNC: 12.4 MMOL/L (ref 5–15)
BASOPHILS # BLD AUTO: 0.02 10*3/MM3 (ref 0–0.2)
BASOPHILS NFR BLD AUTO: 0.1 % (ref 0–1.5)
BUN SERPL-MCNC: 7 MG/DL (ref 6–20)
BUN/CREAT SERPL: 13.7 (ref 7–25)
CALCIUM SPEC-SCNC: 9.2 MG/DL (ref 8.6–10.5)
CHLORIDE SERPL-SCNC: 107 MMOL/L (ref 98–107)
CO2 SERPL-SCNC: 19.6 MMOL/L (ref 22–29)
CREAT SERPL-MCNC: 0.51 MG/DL (ref 0.57–1)
DEPRECATED RDW RBC AUTO: 40.8 FL (ref 37–54)
EGFRCR SERPLBLD CKD-EPI 2021: 129.8 ML/MIN/1.73
EOSINOPHIL # BLD AUTO: 0 10*3/MM3 (ref 0–0.4)
EOSINOPHIL NFR BLD AUTO: 0 % (ref 0.3–6.2)
ERYTHROCYTE [DISTWIDTH] IN BLOOD BY AUTOMATED COUNT: 11.9 % (ref 12.3–15.4)
GLUCOSE SERPL-MCNC: 127 MG/DL (ref 65–99)
HCT VFR BLD AUTO: 38.4 % (ref 34–46.6)
HGB BLD-MCNC: 13.2 G/DL (ref 12–15.9)
IMM GRANULOCYTES # BLD AUTO: 0.15 10*3/MM3 (ref 0–0.05)
IMM GRANULOCYTES NFR BLD AUTO: 0.8 % (ref 0–0.5)
LYMPHOCYTES # BLD AUTO: 1.57 10*3/MM3 (ref 0.7–3.1)
LYMPHOCYTES NFR BLD AUTO: 8.9 % (ref 19.6–45.3)
MCH RBC QN AUTO: 32 PG (ref 26.6–33)
MCHC RBC AUTO-ENTMCNC: 34.4 G/DL (ref 31.5–35.7)
MCV RBC AUTO: 93.2 FL (ref 79–97)
MONOCYTES # BLD AUTO: 0.6 10*3/MM3 (ref 0.1–0.9)
MONOCYTES NFR BLD AUTO: 3.4 % (ref 5–12)
NEUTROPHILS NFR BLD AUTO: 15.36 10*3/MM3 (ref 1.7–7)
NEUTROPHILS NFR BLD AUTO: 86.8 % (ref 42.7–76)
NRBC BLD AUTO-RTO: 0 /100 WBC (ref 0–0.2)
PLATELET # BLD AUTO: 202 10*3/MM3 (ref 140–450)
PMV BLD AUTO: 10.9 FL (ref 6–12)
POTASSIUM SERPL-SCNC: 4.1 MMOL/L (ref 3.5–5.2)
RBC # BLD AUTO: 4.12 10*6/MM3 (ref 3.77–5.28)
SODIUM SERPL-SCNC: 139 MMOL/L (ref 136–145)
WBC NRBC COR # BLD AUTO: 17.7 10*3/MM3 (ref 3.4–10.8)

## 2024-01-07 PROCEDURE — 25010000002 METHYLPREDNISOLONE PER 125 MG: Performed by: INTERNAL MEDICINE

## 2024-01-07 PROCEDURE — 94799 UNLISTED PULMONARY SVC/PX: CPT

## 2024-01-07 PROCEDURE — 94664 DEMO&/EVAL PT USE INHALER: CPT

## 2024-01-07 PROCEDURE — 25010000002 SODIUM CHLORIDE 0.9 % WITH KCL 20 MEQ 20-0.9 MEQ/L-% SOLUTION: Performed by: INTERNAL MEDICINE

## 2024-01-07 PROCEDURE — 25010000002 ENOXAPARIN PER 10 MG: Performed by: INTERNAL MEDICINE

## 2024-01-07 PROCEDURE — 85025 COMPLETE CBC W/AUTO DIFF WBC: CPT | Performed by: INTERNAL MEDICINE

## 2024-01-07 PROCEDURE — 80048 BASIC METABOLIC PNL TOTAL CA: CPT | Performed by: INTERNAL MEDICINE

## 2024-01-07 RX ORDER — BUDESONIDE AND FORMOTEROL FUMARATE DIHYDRATE 80; 4.5 UG/1; UG/1
2 AEROSOL RESPIRATORY (INHALATION)
Qty: 1 EACH | Refills: 0 | Status: SHIPPED | OUTPATIENT
Start: 2024-01-07 | End: 2024-01-12 | Stop reason: RX

## 2024-01-07 RX ORDER — PREDNISONE 10 MG/1
10 TABLET ORAL TAKE AS DIRECTED
Qty: 20 TABLET | Refills: 0 | Status: SHIPPED | OUTPATIENT
Start: 2024-01-07

## 2024-01-07 RX ADMIN — IPRATROPIUM BROMIDE AND ALBUTEROL SULFATE 3 ML: 2.5; .5 SOLUTION RESPIRATORY (INHALATION) at 08:20

## 2024-01-07 RX ADMIN — METHYLPREDNISOLONE SODIUM SUCCINATE 60 MG: 125 INJECTION, POWDER, FOR SOLUTION INTRAMUSCULAR; INTRAVENOUS at 02:40

## 2024-01-07 RX ADMIN — POTASSIUM CHLORIDE AND SODIUM CHLORIDE 100 ML/HR: 900; 150 INJECTION, SOLUTION INTRAVENOUS at 02:46

## 2024-01-07 RX ADMIN — GUAIFENESIN 1200 MG: 600 TABLET, EXTENDED RELEASE ORAL at 08:37

## 2024-01-07 RX ADMIN — ENOXAPARIN SODIUM 40 MG: 100 INJECTION SUBCUTANEOUS at 08:37

## 2024-01-07 RX ADMIN — IPRATROPIUM BROMIDE AND ALBUTEROL SULFATE 3 ML: 2.5; .5 SOLUTION RESPIRATORY (INHALATION) at 12:06

## 2024-01-07 RX ADMIN — METHYLPREDNISOLONE SODIUM SUCCINATE 60 MG: 125 INJECTION, POWDER, FOR SOLUTION INTRAMUSCULAR; INTRAVENOUS at 10:49

## 2024-01-07 RX ADMIN — POTASSIUM CHLORIDE AND SODIUM CHLORIDE 100 ML/HR: 900; 150 INJECTION, SOLUTION INTRAVENOUS at 13:12

## 2024-01-07 RX ADMIN — Medication 10 ML: at 08:54

## 2024-01-07 NOTE — DISCHARGE SUMMARY
Patient Name: Bettye Amador  : 1995  MRN: [id number]    Date of Admission: 2024  Date of Discharge:  2024  Primary Care Physician: Tamara Banks APRN      Chief Complaint:   Ankle Pain      Discharge Diagnoses     Active Hospital Problems    Diagnosis  POA    **Acute asthma exacerbation [J45.901]  Yes      Resolved Hospital Problems   No resolved problems to display.        Hospital Course     Patient is a 28-year-old female with known history of asthma presented to the emergency room with complaints of right ankle pain mainly around the Achilles tendon area.  States and she has difficulty bearing weight/flexing ankle/pain with internal and external rotation.  Imaging studies with no evidence of any acute findings and there is no inflammation of the joint.  In the interim she has also started complaining of cough along with wheezes(history of asthma) and her O2 saturations low 90s and high 80s and given the above she is being hospitalized.  Chest x-ray with no evidence of any infiltrate and tested negative for COVID/flu/RSV.  At the time of my evaluation patient does appear comfortable and not in any major distress.  Not using any accessory muscles     1.Aute asthma exacerbation, shortly after arrival to the floor she started not requiring any oxygen.  She was treated with nebulizers and steroids and has shown significant improvement.  Able to complete sentences without any difficulty.  Will be on tapered dose of steroids upon discharge and Symbicort has been prescribed as well.  She does have albuterol inhaler at home.  Advised patient to follow-up with pulmonary as an outpatient basis and she has verbalized understanding.  Tested negative for influenza/COVID/RSV.    2.  Right ankle pain, that has now been improving quite well and able to bear weight and ambulate.  X-rays with no evidence of any acute findings and no trauma.  The uric acid level was normal as well.    3.  Obesity, counseled to  lose weight.    Day of Discharge         Physical Exam:  Temp:  [98.2 °F (36.8 °C)-98.8 °F (37.1 °C)] 98.2 °F (36.8 °C)  Heart Rate:  [59-93] 84  Resp:  [16-20] 20  BP: (121-135)/(71-84) 134/72  Body mass index is 36.81 kg/m².  Physical Exam    HEENT: PERRLA, extraocular movements intact, Scleras no icterus  Neck: Supple, no JVD  Cardiovascular: Regular rate and rhythm with normal S1 and S2  Respiratory: Improved air movement with very faint wheezes  GI: Soft, nontender, bowel sounds present  Extremities: No edema, palpable pulses  Neurologic: Grossly nonfocal, no facial asymmetry  Skin: Warm and dry with no additional rashes    Consultants     Consult Orders (all) (From admission, onward)       Start     Ordered    01/06/24 1603  Inpatient Infection Control Nurse Consult  Once        Provider:  (Not yet assigned)    01/06/24 1603    01/06/24 1132  [facility] (on-call MD unless specified) Details  Once        Specialty:  Hospitalist  Provider:  (Not yet assigned)    01/06/24 1131                  Procedures     * Surgery not found *    Imaging Results (All)       Procedure Component Value Units Date/Time    XR Ankle 3+ View Right [[id number]] Collected: 01/06/24 1008     Updated: 01/06/24 1012    Narrative:      3 VIEW RIGHT ANKLE     HISTORY: Acute right ankle pain.     FINDINGS: There is no evidence of acute fracture and the ankle mortise  is well preserved.     This report was finalized on 1/6/2024 10:08 AM by Dr. Dru Dong M.D  on Workstation: [id number]       XR Chest 1 View [[id number]] Collected: 01/06/24 1007     Updated: 01/06/24 1011    Narrative:      ONE-VIEW PORTABLE CHEST     HISTORY: Persistent cough.     FINDINGS: The lungs are well expanded and clear and the heart and hilar  structures are normal. There is no acute disease.     This report was finalized on 1/6/2024 10:08 AM by Dr. Dru Dong M.D  on Workstation: [id number]                 Pertinent Labs     Results from last 7 days   Lab Units  "01/07/24  0732 01/06/24  1015   WBC 10*3/mm3 17.70* 10.45   HEMOGLOBIN g/dL 13.2 13.5   PLATELETS 10*3/mm3 202 202     Results from last 7 days   Lab Units 01/07/24  0732 01/06/24  1015   SODIUM mmol/L 139 138   POTASSIUM mmol/L 4.1 4.5   CHLORIDE mmol/L 107 104   CO2 mmol/L 19.6* 17.6*   BUN mg/dL 7 9   CREATININE mg/dL 0.51* 0.71   GLUCOSE mg/dL 127* 88   EGFR mL/min/1.73 129.8 118.2     Results from last 7 days   Lab Units 01/06/24  1015   ALBUMIN g/dL 4.0   BILIRUBIN mg/dL 0.5   ALK PHOS U/L 61   AST (SGOT) U/L 19   ALT (SGPT) U/L 10     Results from last 7 days   Lab Units 01/07/24  0732 01/06/24  1015   CALCIUM mg/dL 9.2 9.3   ALBUMIN g/dL  --  4.0         Results from last 7 days   Lab Units 01/06/24  1730   URIC ACID mg/dL 5.5         Invalid input(s): \"LDLCALC\"      Results from last 7 days   Lab Units 01/06/24  1016   COVID19  Not Detected       Test Results Pending at Discharge       Discharge Details        Discharge Medications        New Medications        Instructions Start Date   budesonide-formoterol 80-4.5 MCG/ACT inhaler  Commonly known as: Symbicort   2 puffs, Inhalation, 2 Times Daily - RT      predniSONE 10 MG tablet  Commonly known as: DELTASONE   10 mg, Oral, Take As Directed, Take 3 tablets daily x 3 days, then 2 tablets daily x 3 days, then 1 tablet daily x 3 days, then half a tablet daily x 3 days             Continue These Medications        Instructions Start Date   albuterol sulfate  (90 Base) MCG/ACT inhaler  Commonly known as: PROVENTIL HFA;VENTOLIN HFA;PROAIR HFA   2 puffs, Inhalation, Every 4 Hours PRN             Stop These Medications      buPROPion  MG 24 hr tablet  Commonly known as: Wellbutrin XL     Phexxi 1.8-1-0.4 % gel  Generic drug: Lactic Ac-Citric Ac-Pot Bitart              No Known Allergies    Discharge Disposition:  Home or Self Care      Discharge Diet:  Diet Order   Procedures    Diet: Regular/House Diet; Texture: Regular Texture (IDDSI 7); Fluid " Consistency: Thin (IDDSI 0)       Discharge Activity:       CODE STATUS:    There are no questions and answers to display.       Future Appointments   Date Time Provider Department Center   12/26/2024  9:15 AM Carin Ryder MD [facility]      Follow-up Information       Tamara Banks APRN .    Specialties: Family Medicine, Nurse Practitioner  Contact information:  [address] [address]  [phone]                             Time Spent on Discharge:  Greater than 30 minutes      Stevie Toscano MD  [facility]  01/07/24  15:01 EST

## 2024-01-07 NOTE — PLAN OF CARE
Problem: Adult Inpatient Plan of Care  Goal: Plan of Care Review  Outcome: Ongoing, Progressing  Flowsheets (Taken 1/7/2024 0323)  Progress: no change  Plan of Care Reviewed With: patient  Outcome Evaluation:   No s/sx of distress noted at this time. Rested some throughout night. Vital signs WDL. On room air   tolerated. IV fluids cont per orders. Medicated for ankle pain with x1 dose of prn pain meds per order with relief noted. Will cont with current plan of care.  Goal: Patient-Specific Goal (Individualized)  Outcome: Ongoing, Progressing  Goal: Absence of Hospital-Acquired Illness or Injury  Outcome: Ongoing, Progressing  Intervention: Identify and Manage Fall Risk  Recent Flowsheet Documentation  Taken 1/7/2024 0240 by Yane Jones, NEGRA  Safety Promotion/Fall Prevention:   activity supervised   assistive device/personal items within reach   clutter free environment maintained   fall prevention program maintained   lighting adjusted   nonskid shoes/slippers when out of bed   room organization consistent   safety round/check completed  Taken 1/7/2024 0053 by Yane Jones, NEGRA  Safety Promotion/Fall Prevention:   activity supervised   assistive device/personal items within reach   clutter free environment maintained   fall prevention program maintained   lighting adjusted   nonskid shoes/slippers when out of bed   safety round/check completed   room organization consistent  Taken 1/6/2024 2204 by Yane Jones, RN  Safety Promotion/Fall Prevention:   activity supervised   assistive device/personal items within reach   clutter free environment maintained   fall prevention program maintained   lighting adjusted   nonskid shoes/slippers when out of bed   room organization consistent   safety round/check completed  Taken 1/6/2024 2013 by Yane Jones, RN  Safety Promotion/Fall Prevention:   activity supervised   assistive device/personal items within reach   clutter free environment maintained   fall prevention program  maintained   lighting adjusted   nonskid shoes/slippers when out of bed   room organization consistent   safety round/check completed  Intervention: Prevent Skin Injury  Recent Flowsheet Documentation  Taken 1/7/2024 0240 by Yane Jones RN  Body Position: position changed independently  Taken 1/7/2024 0053 by Yane Jones RN  Body Position:   position changed independently   supine  Taken 1/6/2024 2204 by Yane Jones RN  Body Position: position changed independently  Taken 1/6/2024 2013 by Yane Jones RN  Body Position:   position changed independently   sitting up in bed  Skin Protection:   adhesive use limited   incontinence pads utilized   transparent dressing maintained   tubing/devices free from skin contact  Intervention: Prevent and Manage VTE (Venous Thromboembolism) Risk  Recent Flowsheet Documentation  Taken 1/7/2024 0240 by Yane Jones RN  Activity Management: up ad robert  Taken 1/7/2024 0053 by Yane Jones RN  Activity Management: up ad robert  Taken 1/6/2024 2204 by Yane Jones RN  Activity Management: up ad robert  Taken 1/6/2024 2013 by Yane Jones RN  Activity Management: up ad robert  VTE Prevention/Management: (lovenox) other (see comments)  Intervention: Prevent Infection  Recent Flowsheet Documentation  Taken 1/7/2024 0240 by Yane Jnoes RN  Infection Prevention:   hand hygiene promoted   rest/sleep promoted   single patient room provided  Taken 1/7/2024 0053 by Yane Jones RN  Infection Prevention:   hand hygiene promoted   rest/sleep promoted   single patient room provided  Taken 1/6/2024 2204 by Yane Jones RN  Infection Prevention:   hand hygiene promoted   rest/sleep promoted   single patient room provided  Goal: Optimal Comfort and Wellbeing  Outcome: Ongoing, Progressing  Intervention: Monitor Pain and Promote Comfort  Recent Flowsheet Documentation  Taken 1/6/2024 2326 by Yane Jones RN  Pain Management Interventions:   see MAR   quiet environment facilitated   cold  applied  Intervention: Provide Person-Centered Care  Recent Flowsheet Documentation  Taken 1/6/2024 2013 by Yane Jones RN  Trust Relationship/Rapport:   care explained   reassurance provided   thoughts/feelings acknowledged  Goal: Readiness for Transition of Care  Outcome: Ongoing, Progressing     Problem: Asthma Comorbidity  Goal: Maintenance of Asthma Control  Outcome: Ongoing, Progressing  Intervention: Maintain Asthma Symptom Control  Recent Flowsheet Documentation  Taken 1/7/2024 0240 by Yane Jones RN  Medication Review/Management: medications reviewed  Taken 1/7/2024 0053 by Yane Jones RN  Medication Review/Management: medications reviewed  Taken 1/6/2024 2204 by Yane Jones RN  Medication Review/Management: medications reviewed  Taken 1/6/2024 2013 by Yane Jones RN  Medication Review/Management: medications reviewed     Problem: Behavioral Health Comorbidity  Goal: Maintenance of Behavioral Health Symptom Control  Outcome: Ongoing, Progressing  Intervention: Maintain Behavioral Health Symptom Control  Recent Flowsheet Documentation  Taken 1/7/2024 0240 by Yane Jones RN  Medication Review/Management: medications reviewed  Taken 1/7/2024 0053 by Yane Jones RN  Medication Review/Management: medications reviewed  Taken 1/6/2024 2204 by Yane Jones RN  Medication Review/Management: medications reviewed  Taken 1/6/2024 2013 by Yane Jones RN  Medication Review/Management: medications reviewed     Problem: COPD (Chronic Obstructive Pulmonary Disease) Comorbidity  Goal: Maintenance of COPD Symptom Control  Outcome: Ongoing, Progressing  Intervention: Maintain COPD-Symptom Control  Recent Flowsheet Documentation  Taken 1/7/2024 0240 by Yane Jones RN  Medication Review/Management: medications reviewed  Taken 1/7/2024 0053 by Yane Jones RN  Supportive Measures:   active listening utilized   verbalization of feelings encouraged  Medication Review/Management: medications reviewed  Taken  1/6/2024 2204 by Yane Jones RN  Medication Review/Management: medications reviewed  Taken 1/6/2024 2013 by Yane Jones RN  Supportive Measures:   active listening utilized   verbalization of feelings encouraged  Medication Review/Management: medications reviewed     Problem: Diabetes Comorbidity  Goal: Blood Glucose Level Within Targeted Range  Outcome: Ongoing, Progressing     Problem: Heart Failure Comorbidity  Goal: Maintenance of Heart Failure Symptom Control  Outcome: Ongoing, Progressing  Intervention: Maintain Heart Failure-Management  Recent Flowsheet Documentation  Taken 1/7/2024 0240 by Yane Jones RN  Medication Review/Management: medications reviewed  Taken 1/7/2024 0053 by Yane Jones RN  Medication Review/Management: medications reviewed  Taken 1/6/2024 2204 by Yane Jones RN  Medication Review/Management: medications reviewed  Taken 1/6/2024 2013 by Yane Jones RN  Medication Review/Management: medications reviewed     Problem: Hypertension Comorbidity  Goal: Blood Pressure in Desired Range  Outcome: Ongoing, Progressing  Intervention: Maintain Blood Pressure Management  Recent Flowsheet Documentation  Taken 1/7/2024 0240 by Yane Jones RN  Medication Review/Management: medications reviewed  Taken 1/7/2024 0053 by Yane Jones RN  Medication Review/Management: medications reviewed  Taken 1/6/2024 2204 by Yane Jones RN  Medication Review/Management: medications reviewed  Taken 1/6/2024 2013 by Yane Jones RN  Medication Review/Management: medications reviewed     Problem: Obstructive Sleep Apnea Risk or Actual Comorbidity Management  Goal: Unobstructed Breathing During Sleep  Outcome: Ongoing, Progressing     Problem: Osteoarthritis Comorbidity  Goal: Maintenance of Osteoarthritis Symptom Control  Outcome: Ongoing, Progressing  Intervention: Maintain Osteoarthritis Symptom Control  Recent Flowsheet Documentation  Taken 1/7/2024 0240 by Yane Jones RN  Activity Management: up ad  robert  Medication Review/Management: medications reviewed  Taken 1/7/2024 0053 by Yane Jones RN  Activity Management: up ad robert  Medication Review/Management: medications reviewed  Taken 1/6/2024 2204 by Yane Jones RN  Activity Management: up ad robert  Medication Review/Management: medications reviewed  Taken 1/6/2024 2013 by Yane Jones RN  Activity Management: up ad robert  Medication Review/Management: medications reviewed     Problem: Pain Chronic (Persistent) (Comorbidity Management)  Goal: Acceptable Pain Control and Functional Ability  Outcome: Ongoing, Progressing  Intervention: Manage Persistent Pain  Recent Flowsheet Documentation  Taken 1/7/2024 0240 by Yane Jones RN  Medication Review/Management: medications reviewed  Taken 1/7/2024 0053 by Yane Jones RN  Medication Review/Management: medications reviewed  Taken 1/6/2024 2204 by Yane Jones RN  Medication Review/Management: medications reviewed  Taken 1/6/2024 2013 by Yane Jones RN  Sleep/Rest Enhancement:   awakenings minimized   regular sleep/rest pattern promoted   relaxation techniques promoted   room darkened  Medication Review/Management: medications reviewed  Intervention: Develop Pain Management Plan  Recent Flowsheet Documentation  Taken 1/6/2024 2326 by Yane Jones RN  Pain Management Interventions:   see MAR   quiet environment facilitated   cold applied  Intervention: Optimize Psychosocial Wellbeing  Recent Flowsheet Documentation  Taken 1/7/2024 0053 by Yane Jones RN  Supportive Measures:   active listening utilized   verbalization of feelings encouraged  Taken 1/6/2024 2013 by Yane Jones RN  Supportive Measures:   active listening utilized   verbalization of feelings encouraged     Problem: Seizure Disorder Comorbidity  Goal: Maintenance of Seizure Control  Outcome: Ongoing, Progressing   Goal Outcome Evaluation:  Plan of Care Reviewed With: patient        Progress: no change  Outcome Evaluation: No s/sx of distress  noted at this time. Rested some throughout night. Vital signs WDL. On room air; tolerated. IV fluids cont per orders. Medicated for ankle pain with x1 dose of prn pain meds per order with relief noted. Will cont with current plan of care.

## 2024-01-07 NOTE — OUTREACH NOTE
Prep Survey      Flowsheet Row Responses   [facility] patient discharged from? [place]   Is LACE score < 7 ? Yes   Eligibility [facility]   Date of Admission 01/06/24   Date of Discharge 01/07/24   Discharge Disposition Home or Self Care   Discharge diagnosis Acute asthma exacerbation   Does the patient have one of the following disease processes/diagnoses(primary or secondary)? Other   Does the patient have Home health ordered? No   Is there a DME ordered? No   Prep survey completed? Yes            LALO KENDRICK - Registered Nurse

## 2024-01-08 ENCOUNTER — TELEPHONE (OUTPATIENT)
Dept: LABOR AND DELIVERY | Facility: HOSPITAL | Age: 29
End: 2024-01-08
Payer: COMMERCIAL

## 2024-01-08 ENCOUNTER — TRANSITIONAL CARE MANAGEMENT TELEPHONE ENCOUNTER (OUTPATIENT)
Dept: CALL CENTER | Facility: HOSPITAL | Age: 29
End: 2024-01-08
Payer: COMMERCIAL

## 2024-01-08 NOTE — PAYOR COMM NOTE
"Bettye Amador (28 y.o. Female)        PLEASE SEE ATTACHED FOR INPT AUTH.     REF#[id number]    PLEASE CALL   OR  [phone]    THANK YOU    MATHEW KING LPN CCP   Date of Birth   1995    Social Security Number       Address   [address] [place] [address] [address] [address]    Home Phone   [phone]    MRN   [id number]       Synagogue   None    Marital Status   Single                            Admission Date   1/6/24    Admission Type   Emergency    Admitting Provider   Stevie Toscano MD    Attending Provider       Department, Room/Bed   [address], [address]/1       Discharge Date   1/7/2024    Discharge Disposition   Home or Self Care    Discharge Destination                                 Attending Provider: (none)   Allergies: No Known Allergies    Isolation: None   Infection: COVID (rule out) (01/06/24)   Code Status: Not on file    Ht: 170.2 cm (67\")   Wt: 107 kg (235 lb)    Admission Cmt: None   Principal Problem: Acute asthma exacerbation [J45.901]                   Active Insurance as of 1/6/2024       Primary Coverage       Payor Plan Insurance Group Employer/Plan Group    ANTHEM BLUE CROSS ANTHEM BLUE CROSS BLUE SHIELD PPO [id number]       Payor Plan Address Payor Plan Phone Number Payor Plan Fax Number Effective Dates    [address] [phone]  9/1/2023 - None Entered    [facility] [id number]         Subscriber Name Subscriber Birth Date Member ID       BETTYE AMADOR 1995 [id number]                     Emergency Contacts        (Rel.) Home Phone Work Phone Mobile Phone    ETHEL GALINDO (Mother) -- -- [phone]              [place]: [facility] [id number]  Tax ID [id number]     History & Physical        Stevie Toscano MD at 01/06/24 3186              Patient Name:  Bettye Amador  YOB: 1995  MRN:  [id number]  Admit Date:  1/6/2024  Patient Care Team:  Tamara Banks APRN as PCP - General (Family " Medicine)      Subjective  History Present Illness     Chief Complaint   Patient presents with    Ankle Pain       Patient is a 28-year-old female with known history of asthma presented to the emergency room with complaints of right ankle pain mainly around the Achilles tendon area.  States and she has difficulty bearing weight/flexing ankle/pain with internal and external rotation.  Imaging studies with no evidence of any acute findings and there is no inflammation of the joint.  In the interim she has also started complaining of cough along with wheezes(history of asthma) and her O2 saturations low 90s and high 80s and given the above she is being hospitalized.  Chest x-ray with no evidence of any infiltrate and tested negative for COVID/flu/RSV.  At the time of my evaluation patient does appear comfortable and not in any major distress.  Not using any accessory muscles.      Review of Systems   A 12 system review has been performed and are negative other than mentioned in the H&P    Personal History     Past Medical History:   Diagnosis Date    Asthma     Sleep apnea      History reviewed. No pertinent surgical history.  Family History   Problem Relation Age of Onset    Mental illness Father     Hypertension Mother     Heart failure Mother     Diabetes Mother     Prostate cancer Paternal Grandfather     Cancer Paternal Grandfather     Breast cancer Paternal Grandmother     Diabetes Maternal Grandmother     Hypertension Maternal Grandmother      Social History     Tobacco Use    Smoking status: Every Day     Packs/day: 1     Types: Cigarettes    Smokeless tobacco: Never   Vaping Use    Vaping Use: Every day   Substance Use Topics    Alcohol use: Yes     Alcohol/week: 1.0 standard drink of alcohol     Types: 1 Glasses of wine per week     Comment: occ    Drug use: Never     No current facility-administered medications on file prior to encounter.     Current Outpatient Medications on File Prior to Encounter    Medication Sig Dispense Refill    albuterol sulfate  (90 Base) MCG/ACT inhaler Inhale 2 puffs Every 4 (Four) Hours As Needed.      buPROPion XL (Wellbutrin XL) 150 MG 24 hr tablet Take 1 tablet by mouth Daily. 30 tablet 1    Lactic Ac-Citric Ac-Pot Bitart (Phexxi) 1.8-1-0.4 % gel Insert 1 applicator into the vagina 1 (One) Time As Needed (Insert up to one hour prior to intercourse) for up to 10 doses. 10 applicator 0     No Known Allergies    Objective   Objective     Vital Signs  Temp:  [97.9 °F (36.6 °C)-98.1 °F (36.7 °C)] 98.1 °F (36.7 °C)  Heart Rate:  [] 83  Resp:  [16-18] 18  BP: (123-140)/(72-86) 123/86  SpO2:  [92 %-99 %] 94 %  on  Flow (L/min):  [2] 2;   Device (Oxygen Therapy): room air  Body mass index is 36.81 kg/m².    Physical Exam  HEENT: PERRLA, extraocular movements intact, Scleras no icterus  Neck: Supple, no JVD  Cardiovascular: Regular rate and rhythm with normal S1 and S2  Respiratory: Diminished breath sounds with mild wheezes  GI: Soft, nontender, bowel sounds present  Extremities: No edema, palpable pulses  Neurologic: Grossly nonfocal, no facial asymmetry  Skin: Warm and dry with no additional rashes    Results Review:  I reviewed the patient's new clinical results.  I reviewed the patient's new imaging results and agree with the interpretation.  I reviewed the patient's other test results and agree with the interpretation  I personally viewed and interpreted the patient's EKG/Telemetry data  Discussed with ED provider.    Lab Results (last 24 hours)       Procedure Component Value Units Date/Time    CBC & Differential [[id number]]  (Abnormal) Collected: 01/06/24 1015    Specimen: Blood Updated: 01/06/24 1032    Narrative:      The following orders were created for panel order CBC & Differential.  Procedure                               Abnormality         Status                     ---------                               -----------         ------                     CBC Auto  Differential[[id number]]        Abnormal            Final result                 Please view results for these tests on the individual orders.    Comprehensive Metabolic Panel [[id number]]  (Abnormal) Collected: 01/06/24 1015    Specimen: Blood Updated: 01/06/24 1055     Glucose 88 mg/dL      BUN 9 mg/dL      Creatinine 0.71 mg/dL      Sodium 138 mmol/L      Potassium 4.5 mmol/L      Comment: Specimen hemolyzed.  Result may be falsely elevated.        Chloride 104 mmol/L      CO2 17.6 mmol/L      Calcium 9.3 mg/dL      Total Protein 7.4 g/dL      Albumin 4.0 g/dL      ALT (SGPT) 10 U/L      Comment: Specimen hemolyzed.  Result may  be falsely elevated.        AST (SGOT) 19 U/L      Comment: Specimen hemolyzed.  Result may be falsely elevated.        Alkaline Phosphatase 61 U/L      Total Bilirubin 0.5 mg/dL      Globulin 3.4 gm/dL      A/G Ratio 1.2 g/dL      BUN/Creatinine Ratio 12.7     Anion Gap 16.4 mmol/L      eGFR 118.2 mL/min/1.73     Narrative:      GFR Normal >60  Chronic Kidney Disease <60  Kidney Failure <15      hCG, Serum, Qualitative [[id number]]  (Normal) Collected: 01/06/24 1015    Specimen: Blood Updated: 01/06/24 1050     HCG Qualitative Negative    CBC Auto Differential [[id number]]  (Abnormal) Collected: 01/06/24 1015    Specimen: Blood Updated: 01/06/24 1032     WBC 10.45 10*3/mm3      RBC 4.47 10*6/mm3      Hemoglobin 13.5 g/dL      Hematocrit 40.6 %      MCV 90.8 fL      MCH 30.2 pg      MCHC 33.3 g/dL      RDW 11.8 %      RDW-SD 38.7 fl      MPV 10.6 fL      Platelets 202 10*3/mm3      Neutrophil % 57.1 %      Lymphocyte % 28.2 %      Monocyte % 10.8 %      Eosinophil % 3.1 %      Basophil % 0.5 %      Immature Grans % 0.3 %      Neutrophils, Absolute 5.97 10*3/mm3      Lymphocytes, Absolute 2.95 10*3/mm3      Monocytes, Absolute 1.13 10*3/mm3      Eosinophils, Absolute 0.32 10*3/mm3      Basophils, Absolute 0.05 10*3/mm3      Immature Grans, Absolute 0.03 10*3/mm3      nRBC 0.0 /100 WBC      COVID-19, FLU A/B, RSV PCR 1 HR TAT - Swab, Nasopharynx [[id number]]  (Normal) Collected: 01/06/24 1016    Specimen: Swab from Nasopharynx Updated: 01/06/24 1105     COVID19 Not Detected     Influenza A PCR Not Detected     Influenza B PCR Not Detected     RSV, PCR Not Detected    Narrative:      Fact sheet for providers: https://www.fda.gov/media/858654/download    Fact sheet for patients: https://www.fda.gov/media/301417/download    Test performed by PCR.            Imaging Results (Last 24 Hours)       Procedure Component Value Units Date/Time    XR Ankle 3+ View Right [[id number]] Collected: 01/06/24 1008     Updated: 01/06/24 1012    Narrative:      3 VIEW RIGHT ANKLE     HISTORY: Acute right ankle pain.     FINDINGS: There is no evidence of acute fracture and the ankle mortise  is well preserved.     This report was finalized on 1/6/2024 10:08 AM by Dr. Dru Dong M.D  on Workstation: [id number]       XR Chest 1 View [[id number]] Collected: 01/06/24 1007     Updated: 01/06/24 1011    Narrative:      ONE-VIEW PORTABLE CHEST     HISTORY: Persistent cough.     FINDINGS: The lungs are well expanded and clear and the heart and hilar  structures are normal. There is no acute disease.     This report was finalized on 1/6/2024 10:08 AM by Dr. Dru Dong M.D  on Workstation: [email or web address]                   ECG 12 Lead Dyspnea   Preliminary Result   HEART RATE= 77  bpm   RR Interval= 779  ms   HI Interval= 172  ms   P Horizontal Axis= 44  deg   P Front Axis= 43  deg   QRSD Interval= 96  ms   QT Interval= 366  ms   QTcB= 415  ms   QRS Axis= 55  deg   T Wave Axis= 54  deg   - NORMAL ECG -   Sinus rhythm   Electronically Signed By:    Date and Time of Study: 2024-01-06 10:07:39           Assessment/Plan     Active Hospital Problems    Diagnosis  POA    **Acute asthma exacerbation [J45.901]  Yes      Resolved Hospital Problems   No resolved problems to display.       1.Acute asthma exacerbation, she will be placed on  nebulizers as well as steroids.  No need for any antibiotics at this point.  If she continues to do well hopefully home tomorrow.  Anticipate no oxygen needs upon discharge.  2.  Right ankle pain, uric acid level will be checked an x-ray with no acute fractures.  No trauma per patient's report.       Stevie Toscano MD  [facility]  01/06/24  17:00 EST      Electronically signed by Stevie Toscaon MD at 01/06/24 1703              Ilan Nolasco MD at 01/06/24 1009           EMERGENCY DEPARTMENT ENCOUNTER    Room Number:  39/39  PCP: Tamara Banks APRN  Independent Historians: Patient    HPI:  Chief Complaint: had concerns including Ankle Pain.  As well as shortness of breath and cough    A complete HPI/ROS/PMH/PSH/SH/FH are unobtainable due to: None    Chronic or social conditions impacting patient care (Social Determinants of Health): None      Context: Bettye Amador is a 28 y.o. female with a medical history of asthma who presents to the ED c/o acute shortness of breath and cough as well as ankle pain.  The patient reports for the last months she has had a cough.  She states she has a history of asthma.  She states she has been using her rescue inhaler daily.  She reports she has a severe cough as well as shortness of breath that is between mild and severe.  She reports she feels tightness in her chest.  She is not on steroids.  She denies pregnancy.  She denies chest pain.  She denies syncope.  She denies fevers.  She states her cough is productive.  Additionally she reports developing right ankle pain 2 days ago.  She denies any specific injury.  She denies any prior similar episodes.  She reports point tenderness to her right ankle at her Achilles medially.  She denies any other other foot or knee pain.  She is a smoker.        Review of prior external notes (non-ED) -and- Review of prior external test results outside of this encounter:     laboratory evaluation 9/11/2023  shows normal CMP, normal CBC    Prescription drug monitoring program review:         PAST MEDICAL HISTORY  Active Ambulatory Problems     Diagnosis Date Noted    Other sleep apnea 08/01/2022    Attention deficit hyperactivity disorder (ADHD), combined type 08/02/2022    Depression 08/02/2022    Chronic left SI joint pain 08/02/2022    Awaiting removal of contraceptive intrauterine device (IUD) 08/02/2022    Mild intermittent asthma without complication 08/02/2022    Nicotine abuse 08/02/2022    Marijuana abuse 08/02/2022    Vitamin D deficiency 08/10/2022     Resolved Ambulatory Problems     Diagnosis Date Noted    No Resolved Ambulatory Problems     Past Medical History:   Diagnosis Date    Asthma     Sleep apnea          PAST SURGICAL HISTORY  History reviewed. No pertinent surgical history.      FAMILY HISTORY  Family History   Problem Relation Age of Onset    Mental illness Father     Hypertension Mother     Heart failure Mother     Diabetes Mother     Prostate cancer Paternal Grandfather     Cancer Paternal Grandfather     Breast cancer Paternal Grandmother     Diabetes Maternal Grandmother     Hypertension Maternal Grandmother          SOCIAL HISTORY  Social History     Socioeconomic History    Marital status: Single   Tobacco Use    Smoking status: Every Day     Packs/day: 1     Types: Cigarettes    Smokeless tobacco: Never   Vaping Use    Vaping Use: Every day   Substance and Sexual Activity    Alcohol use: Yes     Alcohol/week: 1.0 standard drink of alcohol     Types: 1 Glasses of wine per week     Comment: occ    Drug use: Never    Sexual activity: Not Currently     Partners: Male     Birth control/protection: Nexplanon         ALLERGIES  Patient has no known allergies.        REVIEW OF SYSTEMS  Review of Systems  Included in HPI  All systems reviewed and negative except for those discussed in HPI.      PHYSICAL EXAM    I have reviewed the triage vital signs and nursing notes.    ED Triage Vitals [01/06/24  0947]   Temp Heart Rate Resp BP SpO2   97.9 °F (36.6 °C) 105 16 -- 99 %      Temp src Heart Rate Source Patient Position BP Location FiO2 (%)   -- -- -- -- --       Physical Exam  GENERAL: Awake, alert, coughing  SKIN: Warm, dry  HENT: Normocephalic, atraumatic  EYES: no scleral icterus  CV: regular rhythm, regular rate  RESPIRATORY: normal effort, lungs with wheezes bilaterally  ABDOMEN: soft, nontender, nondistended  MUSCULOSKELETAL: no deformity.  No calf tenderness or swelling.  She has point tenderness to her medial right Achilles tendon.  No open wounds.  No deformity.  Pulses are strong.  Normal sensation and motor.  NEURO: alert, moves all extremities, follows commands      NIH:                                                         PPE: The patient wore: mask and I wore: gloves and N95 mask throughout the entire patient encounter.    LAB RESULTS  Recent Results (from the past 24 hour(s))   ECG 12 Lead Dyspnea    Collection Time: 01/06/24 10:07 AM   Result Value Ref Range    QT Interval 366 ms    QTC Interval 415 ms   Comprehensive Metabolic Panel    Collection Time: 01/06/24 10:15 AM    Specimen: Blood   Result Value Ref Range    Glucose 88 65 - 99 mg/dL    BUN 9 6 - 20 mg/dL    Creatinine 0.71 0.57 - 1.00 mg/dL    Sodium 138 136 - 145 mmol/L    Potassium 4.5 3.5 - 5.2 mmol/L    Chloride 104 98 - 107 mmol/L    CO2 17.6 (L) 22.0 - 29.0 mmol/L    Calcium 9.3 8.6 - 10.5 mg/dL    Total Protein 7.4 6.0 - 8.5 g/dL    Albumin 4.0 3.5 - 5.2 g/dL    ALT (SGPT) 10 1 - 33 U/L    AST (SGOT) 19 1 - 32 U/L    Alkaline Phosphatase 61 39 - 117 U/L    Total Bilirubin 0.5 0.0 - 1.2 mg/dL    Globulin 3.4 gm/dL    A/G Ratio 1.2 g/dL    BUN/Creatinine Ratio 12.7 7.0 - 25.0    Anion Gap 16.4 (H) 5.0 - 15.0 mmol/L    eGFR 118.2 >60.0 mL/min/1.73   hCG, Serum, Qualitative    Collection Time: 01/06/24 10:15 AM    Specimen: Blood   Result Value Ref Range    HCG Qualitative Negative Negative   CBC Auto Differential    Collection  Time: 01/06/24 10:15 AM    Specimen: Blood   Result Value Ref Range    WBC 10.45 3.40 - 10.80 10*3/mm3    RBC 4.47 3.77 - 5.28 10*6/mm3    Hemoglobin 13.5 12.0 - 15.9 g/dL    Hematocrit 40.6 34.0 - 46.6 %    MCV 90.8 79.0 - 97.0 fL    MCH 30.2 26.6 - 33.0 pg    MCHC 33.3 31.5 - 35.7 g/dL    RDW 11.8 (L) 12.3 - 15.4 %    RDW-SD 38.7 37.0 - 54.0 fl    MPV 10.6 6.0 - 12.0 fL    Platelets 202 140 - 450 10*3/mm3    Neutrophil % 57.1 42.7 - 76.0 %    Lymphocyte % 28.2 19.6 - 45.3 %    Monocyte % 10.8 5.0 - 12.0 %    Eosinophil % 3.1 0.3 - 6.2 %    Basophil % 0.5 0.0 - 1.5 %    Immature Grans % 0.3 0.0 - 0.5 %    Neutrophils, Absolute 5.97 1.70 - 7.00 10*3/mm3    Lymphocytes, Absolute 2.95 0.70 - 3.10 10*3/mm3    Monocytes, Absolute 1.13 (H) 0.10 - 0.90 10*3/mm3    Eosinophils, Absolute 0.32 0.00 - 0.40 10*3/mm3    Basophils, Absolute 0.05 0.00 - 0.20 10*3/mm3    Immature Grans, Absolute 0.03 0.00 - 0.05 10*3/mm3    nRBC 0.0 0.0 - 0.2 /100 WBC   COVID-19, FLU A/B, RSV PCR 1 HR TAT - Swab, Nasopharynx    Collection Time: 01/06/24 10:16 AM    Specimen: Nasopharynx; Swab   Result Value Ref Range    COVID19 Not Detected Not Detected - Ref. Range    Influenza A PCR Not Detected Not Detected    Influenza B PCR Not Detected Not Detected    RSV, PCR Not Detected Not Detected         RADIOLOGY  XR Ankle 3+ View Right    Result Date: 1/6/2024  3 VIEW RIGHT ANKLE  HISTORY: Acute right ankle pain.  FINDINGS: There is no evidence of acute fracture and the ankle mortise is well preserved.  This report was finalized on 1/6/2024 10:08 AM by Dr. Dru Dong M.D on Workstation: [id number]      XR Chest 1 View    Result Date: 1/6/2024  ONE-VIEW PORTABLE CHEST  HISTORY: Persistent cough.  FINDINGS: The lungs are well expanded and clear and the heart and hilar structures are normal. There is no acute disease.  This report was finalized on 1/6/2024 10:08 AM by Dr. Dru Dong M.D on Workstation: [id number]         MEDICATIONS GIVEN IN  ER  Medications   albuterol (PROVENTIL) nebulizer solution 0.083% 2.5 mg/3mL (2.5 mg Nebulization Given 1/6/24 1030)   methylPREDNISolone sodium succinate (SOLU-Medrol) injection 125 mg (125 mg Intravenous Given 1/6/24 1016)         ORDERS PLACED DURING THIS VISIT:  Orders Placed This Encounter   Procedures    COVID-19, FLU A/B, RSV PCR 1 HR TAT - Swab, Nasopharynx    XR Chest 1 View    XR Ankle 3+ View Right    Comprehensive Metabolic Panel    hCG, Serum, Qualitative    CBC Auto Differential    [facility] (on-call MD unless specified) Details    Pulse Oximetry While Ambulating    ECG 12 Lead Dyspnea    Inpatient Admission    CBC & Differential         OUTPATIENT MEDICATION MANAGEMENT:  No current Epic-ordered facility-administered medications on file.     Current Outpatient Medications Ordered in Epic   Medication Sig Dispense Refill    albuterol sulfate  (90 Base) MCG/ACT inhaler Inhale 2 puffs Every 4 (Four) Hours As Needed.      buPROPion XL (Wellbutrin XL) 150 MG 24 hr tablet Take 1 tablet by mouth Daily. 30 tablet 1    Lactic Ac-Citric Ac-Pot Bitart (Phexxi) 1.8-1-0.4 % gel Insert 1 applicator into the vagina 1 (One) Time As Needed (Insert up to one hour prior to intercourse) for up to 10 doses. 10 applicator 0         PROCEDURES  Procedures        Critical care provider statement:    Critical care time (minutes): 30-74.   Critical care time was exclusive of:  Separately billable procedures and treating other patients   Critical care was necessary to treat or prevent imminent or life-threatening deterioration of the following conditions:  Respiratory Failure   Critical care was time spent personally by me on the following activities:  Development of treatment plan with patient or surrogate, discussions with consultants, evaluation of patient's response to treatment, examination of patient, obtaining history from patient or surrogate, ordering and performing treatments and interventions, ordering and review of  laboratory studies, ordering and review of radiographic studies, pulse oximetry, re-evaluation of patient's condition and review of old charts. Critical Care indicators: Asthma, multiple treatments with more risk       PROGRESS, DATA ANALYSIS, CONSULTS, AND MEDICAL DECISION MAKING  All labs have been independently interpreted by me.  All radiology studies have been reviewed by me. All EKG's have been independently viewed and interpreted by me.  Discussion below represents my analysis of pertinent findings related to patient's condition, differential diagnosis, treatment plan and final disposition.    Differential diagnosis includes but is not limited to pneumonia, hypoxia, viral syndrome, COVID, flu, ankle sprain, gout, DVT, PE.    Clinical Scores:                 ED Course as of 01/06/24 1216   Sat Jan 06, 2024   1008 XR Chest 1 View  My independent interpretation of the chest x-ray is no dense consolidation [TR]   1008 EKG          EKG time: 1007  Rhythm/Rate: Normal sinus, rate 77  P waves and KY: Normal P, Normal KY  QRS, axis: Narrow QRS, Normal axis  ST and T waves: No acute changes    Independently Interpreted by me  Prior EKG available for comparison   [TR]   1118 I reviewed the workup and findings with the patient at the bedside.  Answered all questions.  She reports she is breathing much better.  She does have some coarse breath sounds bilaterally emanating from her posterior pharynx.  She does have a rare wheeze.  Her EKG is normal.  Her chest x-ray is clear.  Her COVID and flu testing is negative.  Plan to ambulate her and determine oxygen saturation.  If her oxygen saturation drops significantly she will need admission for management of acute asthma exacerbation.  If her oxygenation stays normal then plan discharge on steroids as well as nebulizer.  She has had 3 total nebulizer treatments here as well as steroids.  She is agreeable to the plan. [TR]   1129 O2 sats 91% with ambulation.  At rest 88%.  [TR]   1132 Plan admission.  She is agreeable. [TR]   1143 CO2(!): 17.6 [TR]   1214 Discussing with Dr. Blevins with A.  He agrees to admit. [TR]      ED Course User Index  [TR] Ilan Nolasco MD                   AS OF 12:16 EST VITALS:    BP - 140/72  HR - 89  TEMP - 97.9 °F (36.6 °C)  O2 SATS - 97%      COMPLEXITY OF CARE  Number / Complexity of Problems Addressed at this Encounter:   Problems addressed this visit: Acute hypoxemic respiratory failure, acute asthma exacerbation, acute right ankle injury  Factors that increase risk in this patient: History of smoking, hypoxia  Amount and/or Complexity of Data to be Reviewed and Analyzed:   Risk of Complications and/or Morbidity or Mortality of Patient Management:         DIAGNOSIS  Final diagnoses:   Severe persistent asthma with acute exacerbation   Acute hypoxemic respiratory failure   Acute right ankle pain   Smoker         DISPOSITION  ED Disposition       ED Disposition   Decision to Admit    Condition   --    Comment   Level of Care: Telemetry [5]   Diagnosis: Acute asthma exacerbation [671760]   Admitting Physician: BRITTANY BLEVINS [[id number]]   Attending Physician: BRITTANY BLEVINS [[id number]]   Certification: I Certify That Inpatient Hospital Services Are Medically Necessary For Greater Than 2 Midnights                  Please note that portions of this document were completed with a voice recognition program.    Note Disclaimer: At [facility], we believe that sharing information builds trust and better relationships. You are receiving this note because you recently visited [facility]. It is possible you will see health information before a provider has talked with you about it. This kind of information can be easy to misunderstand. To help you fully understand what it means for your health, we urge you to discuss this note with your provider.         Ilan Nolasco MD  01/06/24 1216      Electronically signed by Ilan Nolasco MD at  01/06/24 1216       Oxygen Therapy (last 2 days) before discharge       Date/Time SpO2 Device (Oxygen Therapy) Flow (L/min) Oxygen Concentration (%) ETCO2 (mmHg)    01/07/24 1423 -- room air -- -- --    01/07/24 1327 100 room air -- -- --    01/07/24 1207 96 room air -- -- --    01/07/24 0837 -- room air -- -- --    01/07/24 0820 92 room air -- -- --    01/07/24 0728 100 nasal cannula -- -- --    01/07/24 0500 95 -- -- -- --    01/07/24 0420 92 -- -- -- --    01/07/24 0320 91 -- -- -- --    01/07/24 0300 93 -- -- -- --    01/07/24 0100 92 -- -- -- --    01/07/24 0053 -- room air -- -- --    01/07/24 0015 98 room air -- -- --    01/06/24 2300 93 -- -- -- --    01/06/24 2124 90 room air -- -- --    01/06/24 2013 97 room air -- -- --    01/06/24 1955 94 room air -- -- --    01/06/24 1447 -- room air -- -- --    01/06/24 1443 94 nasal cannula 2 -- --    01/06/24 1408 95 -- -- -- --    01/06/24 1146 97 -- -- -- --    01/06/24 1130 -- nasal cannula 2 -- --    01/06/24 1010 92 -- -- -- --    01/06/24 1000 94 room air -- -- --    01/06/24 0947 99 -- -- -- --          Intake & Output (last 3 days)         01/05 0701 01/06 0700 01/06 0701  01/07 0700 01/07 0701 01/08 0700 01/08 0701 01/09 0700    P.O.   480     I.V. (mL/kg)  900 (8.4)      Total Intake(mL/kg)  900 (8.4) 480 (4.5)     Net  +900 +480             Urine Unmeasured Occurrence  2 x      Stool Unmeasured Occurrence  1 x             Lines, Drains & Airways       Active LDAs       None              Inactive LDAs       Name Placement date Placement time Removal date Removal time Site Days    [REMOVED] Peripheral IV 01/06/24 1010 Right Antecubital 01/06/24  1010  01/07/24  1516  Antecubital  1                  edication Administration Report for Bettye Amador as of 01/08/24 0829     Legend:    Given Hold Not Given Due Canceled Entry Other Actions    Time Time (Time) Time Time-Action         Discontinued     Completed     Future     MAR Hold     Linked              Medications 01/05/24 01/06/24 01/07/24     Completed Medications    albuterol (PROVENTIL) nebulizer solution 0.083% 2.5 mg/3mL  Dose: 2.5 mg  Freq: Every 15 Minutes Route: NEBULIZATION  Start: 01/06/24 1008   End: 01/06/24 1030   Admin Instructions:   Include Respiratory Treatment Education     1000-Given     1015-Given     1030-Given            methylPREDNISolone sodium succinate (SOLU-Medrol) injection 125 mg  Dose: 125 mg  Freq: Once Route: IV  Start: 01/06/24 1008   End: 01/06/24 1016   Admin Instructions:   Caution: Look alike/sound alike drug alert     1016-Given             Discontinued Medications  Medications 01/05/24 01/06/24 01/07/24       acetaminophen (TYLENOL) tablet 650 mg  Dose: 650 mg  Freq: Every 4 Hours PRN Route: PO  PRN Reason: Mild Pain  Start: 01/06/24 1555   End: 01/07/24 1729   Admin Instructions:   If given for fever, use fever parameter: fever greater than 100.4 °F  Based on patient request - if ordered for moderate or severe pain, provider allows for administration of a medication prescribed for a lower pain scale.    Do not exceed 4 grams of acetaminophen in a 24 hr period. Max dose of 2gm for AST/ALT greater than 120 units/L.    If given for pain, use the following pain scale:   Mild Pain = Pain Score of 1-3, CPOT 1-2  Moderate Pain = Pain Score of 4-6, CPOT 3-4  Severe Pain = Pain Score of 7-10, CPOT 5-8          benzonatate (TESSALON) capsule 100 mg  Dose: 100 mg  Freq: 3 Times Daily PRN Route: PO  PRN Reason: Cough  Start: 01/06/24 1556   End: 01/07/24 1729   Admin Instructions:   Do not crush or chew the capsules or tablets. The drug may not work as designed if the capsule or tablet is crushed or chewed. Swallow whole.  Swallow whole.  Do not crush, chew, or open capsule.          sennosides-docusate (PERICOLACE) 8.6-50 MG per tablet 2 tablet  Dose: 2 tablet  Freq: 2 Times Daily Route: PO  Start: 01/06/24 2100   End: 01/07/24 1729   Admin Instructions:   HOLD MEDICATION IF  "PATIENT HAS HAD BOWEL MOVEMENT. Start bowel management regimen if patient has not had a bowel movement after 12 hours.     (2025)-Not Given          (0853)-Not Given            And  polyethylene glycol (MIRALAX) packet 17 g  Dose: 17 g  Freq: Daily PRN Route: PO  PRN Reason: Constipation  PRN Comment: Use if senna-docusate is ineffective  Start: 01/06/24 1555   End: 01/07/24 1729   Admin Instructions:   Use if no bowel movement after 12 hours. Mix in 6-8 ounces of water.  Use 4-8 ounces of water, tea, or juice for each 17 gram dose.         And  bisacodyl (DULCOLAX) EC tablet 5 mg  Dose: 5 mg  Freq: Daily PRN Route: PO  PRN Reason: Constipation  PRN Comment: Use if polyethylene glycol is ineffective  Start: 01/06/24 1555   End: 01/07/24 1729   Admin Instructions:   Use if no bowel movement after 12 hours.  Swallow whole. Do not crush, split, or chew tablet.         And  bisacodyl (DULCOLAX) suppository 10 mg  Dose: 10 mg  Freq: Daily PRN Route: RE  PRN Reason: Constipation  PRN Comment: Use if bisacodyl oral is ineffective  Start: 01/06/24 1555   End: 01/07/24 1729   Admin Instructions:   Use if no bowel movement after 12 hours.  Hold for diarrhea          Calcium Replacement - Follow Nurse / BPA Driven Protocol  Freq: As Needed Route: XX  PRN Reason: Other  Start: 01/06/24 1555   End: 01/07/24 1729   Admin Instructions:   Open Order & Select \"[facility] Electrolyte Replacement Protocol Algorithm\" to View Details          Enoxaparin Sodium (LOVENOX) syringe 40 mg  Dose: 40 mg  Freq: Daily Route: SC  Indications of Use: PROPHYLAXIS OF VENOUS THROMBOEMBOLISM  Start: 01/06/24 1700   End: 01/07/24 1729   Admin Instructions:   Give subcutaneous in abdomen only. Do not massage site after injection.     1748-Given          0837-Given             guaiFENesin (MUCINEX) 12 hr tablet 1,200 mg  Dose: 1,200 mg  Freq: Every 12 Hours Scheduled Route: PO  Start: 01/06/24 2100   End: 01/07/24 1729   Admin Instructions:   Caution: Look " "alike/sound alike drug alert               Do not crush, split, or chew.     2025-Given          0837-Given             HYDROcodone-acetaminophen (NORCO) 5-325 MG per tablet 1 tablet  Dose: 1 tablet  Freq: Every 4 Hours PRN Route: PO  PRN Reason: Moderate Pain  Start: 01/06/24 1556   End: 01/07/24 1729   Admin Instructions:   Based on patient request - if ordered for moderate or severe pain, provider allows for administration of a medication prescribed for a lower pain scale.  [LINUS]    Do not exceed 4 grams of acetaminophen in a 24 hr period. Max dose of 2gm for AST/ALT greater than 120 units/L        If given for pain, use the following pain scale:   Mild Pain = Pain Score of 1-3, CPOT 1-2  Moderate Pain = Pain Score of 4-6, CPOT 3-4  Severe Pain = Pain Score of 7-10, CPOT 5-8     2326-Given              ipratropium-albuterol (DUO-NEB) nebulizer solution 3 mL  Dose: 3 mL  Freq: 4 Times Daily - RT Route: NEBULIZATION  Start: 01/06/24 2030   End: 01/07/24 1729 2124-Given          0820-Given     1206-Given            Magnesium Standard Dose Replacement - Follow Nurse / BPA Driven Protocol  Freq: As Needed Route: XX  PRN Reason: Other  Start: 01/06/24 1555   End: 01/07/24 1729   Admin Instructions:   Open Order & Select \"[facility] Electrolyte Replacement Protocol Algorithm\" to View Details          methylPREDNISolone sodium succinate (SOLU-Medrol) injection 60 mg  Dose: 60 mg  Freq: Every 8 Hours Route: IV  Start: 01/06/24 1830   End: 01/07/24 1729   Admin Instructions:   Caution: Look alike/sound alike drug alert     1748-Given          0240-Given     1049-Given            nitroglycerin (NITROSTAT) SL tablet 0.4 mg  Dose: 0.4 mg  Freq: Every 5 Minutes PRN Route: SL  PRN Reason: Chest Pain  PRN Comment: Only if SBP Greater Than 100  Start: 01/06/24 1442   End: 01/07/24 1729   Admin Instructions:   If Pain Unrelieved After 3 Doses Notify MD  May administer up to 3 doses per episode.          Pharmacy to Dose enoxaparin " "(LOVENOX)  Freq: Continuous PRN Route: XX  PRN Reason: Consult  Indications of Use: PROPHYLAXIS OF VENOUS THROMBOEMBOLISM  Start: 24 155   End: 24 1604   Order specific questions:   Indication of use Prophylaxis            Phosphorus Replacement - Follow Nurse / BPA Driven Protocol  Freq: As Needed Route: XX  PRN Reason: Other  Start: 24 155   End: 24 172   Admin Instructions:   Open Order & Select \"[facility] Electrolyte Replacement Protocol Algorithm\" to View Details          Potassium Replacement - Follow Nurse / BPA Driven Protocol  Freq: As Needed Route: XX  PRN Reason: Other  Start: 24 155   End: 24   Admin Instructions:   Open Order & Select \"[facility] Electrolyte Replacement Protocol Algorithm\" to View Details          sodium chloride 0.9 % flush 10 mL  Dose: 10 mL  Freq: As Needed Route: IV  PRN Reason: Line Care  Start: 24 155   End: 24 172          sodium chloride 0.9 % flush 10 mL  Dose: 10 mL  Freq: Every 12 Hours Scheduled Route: IV  Start: 24   End: 24     2025-Given          0854-Given             sodium chloride 0.9 % infusion 40 mL  Dose: 40 mL  Freq: As Needed Route: IV  PRN Reason: Line Care  Start: 24 155   End: 24   Admin Instructions:   Following administration of an IV intermittent medication, flush line with 40mL NS at 100mL/hr.          sodium chloride 0.9 % with KCl 20 mEq/L infusion  Rate: 100 mL/hr Dose: 100 mL/hr  Freq: Continuous Route: IV  Start: 24 164   End: 24 172     (1645)-Not Given     1749-New Bag         0246-New Bag     1312-New Bag                            Discharge Summary        Stevie Toscano MD at 24 1501              Patient Name: Bettye Amador  : 1995  MRN: [id number]    Date of Admission: 2024  Date of Discharge:  2024  Primary Care Physician: Tamara Banks APRN      Chief Complaint:   Ankle Pain      Discharge Diagnoses     Active " Hospital Problems    Diagnosis  POA    **Acute asthma exacerbation [J45.901]  Yes      Resolved Hospital Problems   No resolved problems to display.        Hospital Course     Patient is a 28-year-old female with known history of asthma presented to the emergency room with complaints of right ankle pain mainly around the Achilles tendon area.  States and she has difficulty bearing weight/flexing ankle/pain with internal and external rotation.  Imaging studies with no evidence of any acute findings and there is no inflammation of the joint.  In the interim she has also started complaining of cough along with wheezes(history of asthma) and her O2 saturations low 90s and high 80s and given the above she is being hospitalized.  Chest x-ray with no evidence of any infiltrate and tested negative for COVID/flu/RSV.  At the time of my evaluation patient does appear comfortable and not in any major distress.  Not using any accessory muscles     1.Aute asthma exacerbation, shortly after arrival to the floor she started not requiring any oxygen.  She was treated with nebulizers and steroids and has shown significant improvement.  Able to complete sentences without any difficulty.  Will be on tapered dose of steroids upon discharge and Symbicort has been prescribed as well.  She does have albuterol inhaler at home.  Advised patient to follow-up with pulmonary as an outpatient basis and she has verbalized understanding.  Tested negative for influenza/COVID/RSV.    2.  Right ankle pain, that has now been improving quite well and able to bear weight and ambulate.  X-rays with no evidence of any acute findings and no trauma.  The uric acid level was normal as well.    3.  Obesity, counseled to lose weight.    Day of Discharge         Physical Exam:  Temp:  [98.2 °F (36.8 °C)-98.8 °F (37.1 °C)] 98.2 °F (36.8 °C)  Heart Rate:  [59-93] 84  Resp:  [16-20] 20  BP: (121-135)/(71-84) 134/72  Body mass index is 36.81 kg/m².  Physical  Exam    HEENT: PERRLA, extraocular movements intact, Scleras no icterus  Neck: Supple, no JVD  Cardiovascular: Regular rate and rhythm with normal S1 and S2  Respiratory: Improved air movement with very faint wheezes  GI: Soft, nontender, bowel sounds present  Extremities: No edema, palpable pulses  Neurologic: Grossly nonfocal, no facial asymmetry  Skin: Warm and dry with no additional rashes    Consultants     Consult Orders (all) (From admission, onward)       Start     Ordered    01/06/24 1603  Inpatient Infection Control Nurse Consult  Once        Provider:  (Not yet assigned)    01/06/24 1603    01/06/24 1132  [facility] (on-call MD unless specified) Details  Once        Specialty:  Hospitalist  Provider:  (Not yet assigned)    01/06/24 1131                  Procedures     * Surgery not found *    Imaging Results (All)       Procedure Component Value Units Date/Time    XR Ankle 3+ View Right [[id number]] Collected: 01/06/24 1008     Updated: 01/06/24 1012    Narrative:      3 VIEW RIGHT ANKLE     HISTORY: Acute right ankle pain.     FINDINGS: There is no evidence of acute fracture and the ankle mortise  is well preserved.     This report was finalized on 1/6/2024 10:08 AM by Dr. Dru Dong M.D  on Workstation: [id number]       XR Chest 1 View [[id number]] Collected: 01/06/24 1007     Updated: 01/06/24 1011    Narrative:      ONE-VIEW PORTABLE CHEST     HISTORY: Persistent cough.     FINDINGS: The lungs are well expanded and clear and the heart and hilar  structures are normal. There is no acute disease.     This report was finalized on 1/6/2024 10:08 AM by Dr. Dru Dong M.D  on Workstation: [id number]                 Pertinent Labs     Results from last 7 days   Lab Units 01/07/24  0732 01/06/24  1015   WBC 10*3/mm3 17.70* 10.45   HEMOGLOBIN g/dL 13.2 13.5   PLATELETS 10*3/mm3 202 202     Results from last 7 days   Lab Units 01/07/24  0732 01/06/24  1015   SODIUM mmol/L 139 138   POTASSIUM mmol/L 4.1 4.5  "  CHLORIDE mmol/L 107 104   CO2 mmol/L 19.6* 17.6*   BUN mg/dL 7 9   CREATININE mg/dL 0.51* 0.71   GLUCOSE mg/dL 127* 88   EGFR mL/min/1.73 129.8 118.2     Results from last 7 days   Lab Units 01/06/24  1015   ALBUMIN g/dL 4.0   BILIRUBIN mg/dL 0.5   ALK PHOS U/L 61   AST (SGOT) U/L 19   ALT (SGPT) U/L 10     Results from last 7 days   Lab Units 01/07/24  0732 01/06/24  1015   CALCIUM mg/dL 9.2 9.3   ALBUMIN g/dL  --  4.0         Results from last 7 days   Lab Units 01/06/24  1730   URIC ACID mg/dL 5.5         Invalid input(s): \"LDLCALC\"      Results from last 7 days   Lab Units 01/06/24  1016   COVID19  Not Detected       Test Results Pending at Discharge       Discharge Details        Discharge Medications        New Medications        Instructions Start Date   budesonide-formoterol 80-4.5 MCG/ACT inhaler  Commonly known as: Symbicort   2 puffs, Inhalation, 2 Times Daily - RT      predniSONE 10 MG tablet  Commonly known as: DELTASONE   10 mg, Oral, Take As Directed, Take 3 tablets daily x 3 days, then 2 tablets daily x 3 days, then 1 tablet daily x 3 days, then half a tablet daily x 3 days             Continue These Medications        Instructions Start Date   albuterol sulfate  (90 Base) MCG/ACT inhaler  Commonly known as: PROVENTIL HFA;VENTOLIN HFA;PROAIR HFA   2 puffs, Inhalation, Every 4 Hours PRN             Stop These Medications      buPROPion  MG 24 hr tablet  Commonly known as: Wellbutrin XL     Phexxi 1.8-1-0.4 % gel  Generic drug: Lactic Ac-Citric Ac-Pot Bitart              No Known Allergies    Discharge Disposition:  Home or Self Care      Discharge Diet:  Diet Order   Procedures    Diet: Regular/House Diet; Texture: Regular Texture (IDDSI 7); Fluid Consistency: Thin (IDDSI 0)       Discharge Activity:       CODE STATUS:    There are no questions and answers to display.       Future Appointments   Date Time Provider Department Center   12/26/2024  9:15 AM Carin Ryder MD [facility] "  [facility]      Follow-up Information       Tamara Banks APRN .    Specialties: Family Medicine, Nurse Practitioner  Contact information:  [address]  [place] [address]  [phone]                             Time Spent on Discharge:  Greater than 30 minutes      Stevie Blevins MD  [facility]  01/07/24  15:01 EST                Electronically signed by Stevie Blevins MD at 01/07/24 1503       Discharge Order (From admission, onward)       Start     Ordered    01/07/24 1501  Discharge patient  Once        Expected Discharge Date: 01/07/24   Discharge Disposition: Home or Self Care   Physician of Record for Attribution - Please select from Treatment Team: STEVIE BLEVINS [[id number]]   Review needed by CMO to determine Physician of Record: No   Please choose which facility the patient is currently admitted if they are being discharged to another facility or unit.: [facility]      Question Answer Comment   Physician of Record for Attribution - Please select from Treatment Team STEVIE BLEVINS    Review needed by CMO to determine Physician of Record No    Please choose which facility the patient is currently admitted if they are being discharged to another facility or unit. [facility]        01/07/24 1501

## 2024-01-08 NOTE — CASE MANAGEMENT/SOCIAL WORK
Case Management Discharge Note      Final Note: home no needs         Selected Continued Care - Discharged on 1/7/2024 Admission date: 1/6/2024 - Discharge disposition: Home or Self Care      Destination    No services have been selected for the patient.                Durable Medical Equipment    No services have been selected for the patient.                Dialysis/Infusion    No services have been selected for the patient.                Home Medical Care    No services have been selected for the patient.                Therapy    No services have been selected for the patient.                Community Resources    No services have been selected for the patient.                Community & DME    No services have been selected for the patient.                    Transportation Services  Private: Car    Final Discharge Disposition Code: 01 - home or self-care

## 2024-01-08 NOTE — TELEPHONE ENCOUNTER
Called patient to discuss findings of FILIBERTO 1 on cervical biopsy. WE discussed that this is a low grade precancerous lesion, however we do not recommend any excisional procedures at this time, but rather a closer follow-up at one year. Patient does not have any questions and will call to schedule an appointment for IUD placement.

## 2024-01-08 NOTE — OUTREACH NOTE
Call Center TCM Note      Flowsheet Row Responses   [facility] patient discharged from? [place]   Does the patient have one of the following disease processes/diagnoses(primary or secondary)? Other   TCM attempt successful? Yes   Call start time 1604   Call end time 1608   Discharge diagnosis Acute asthma exacerbation   Meds reviewed with patient/caregiver? Yes   Does the patient have all medications ordered at discharge? No   Nursing Interventions No intervention needed   Prescription comments Patient states she will  her prescriptions later today.   Does the patient have an appointment with their PCP within 7-14 days of discharge? No   Nursing Interventions Assisted patient with making appointment per protocol   Has home health visited the patient within 72 hours of discharge? N/A   Psychosocial issues? No   Did the patient receive a copy of their discharge instructions? Yes   Nursing interventions Reviewed instructions with patient   What is the patient's perception of their health status since discharge? Improving   Is the patient/caregiver able to teach back signs and symptoms related to disease process for when to call PCP? Yes   Is the patient/caregiver able to teach back signs and symptoms related to disease process for when to call 911? Yes   Is the patient/caregiver able to teach back the hierarchy of who to call/visit for symptoms/problems? PCP, Specialist, Home health nurse, Urgent Care, ED, 911 Yes   TCM call completed? Yes   Call end time 1608   Would this patient benefit from a Referral to Amb Social Work? No   Is the patient interested in additional calls from an ambulatory ? No            Maida Cardona LPN    1/8/2024, 16:08 EST

## 2024-01-12 ENCOUNTER — OFFICE VISIT (OUTPATIENT)
Dept: OBSTETRICS AND GYNECOLOGY | Age: 29
End: 2024-01-12
Payer: COMMERCIAL

## 2024-01-12 ENCOUNTER — OFFICE VISIT (OUTPATIENT)
Dept: INTERNAL MEDICINE | Age: 29
End: 2024-01-12
Payer: COMMERCIAL

## 2024-01-12 VITALS
HEIGHT: 67 IN | DIASTOLIC BLOOD PRESSURE: 84 MMHG | TEMPERATURE: 97.6 F | BODY MASS INDEX: 35.94 KG/M2 | HEART RATE: 75 BPM | SYSTOLIC BLOOD PRESSURE: 130 MMHG | WEIGHT: 229 LBS | OXYGEN SATURATION: 97 %

## 2024-01-12 VITALS
BODY MASS INDEX: 35.79 KG/M2 | DIASTOLIC BLOOD PRESSURE: 74 MMHG | WEIGHT: 228 LBS | HEIGHT: 67 IN | SYSTOLIC BLOOD PRESSURE: 132 MMHG

## 2024-01-12 DIAGNOSIS — Z30.430 ENCOUNTER FOR IUD INSERTION: ICD-10-CM

## 2024-01-12 DIAGNOSIS — Z30.46 NEXPLANON REMOVAL: Primary | ICD-10-CM

## 2024-01-12 DIAGNOSIS — J45.21 MILD INTERMITTENT ASTHMA WITH EXACERBATION: Primary | ICD-10-CM

## 2024-01-12 RX ORDER — COPPER 313.4 MG/1
INTRAUTERINE DEVICE INTRAUTERINE ONCE
Status: COMPLETED | OUTPATIENT
Start: 2024-01-12 | End: 2024-01-12

## 2024-01-12 RX ORDER — COPPER 313.4 MG/1
INTRAUTERINE DEVICE INTRAUTERINE
COMMUNITY
Start: 2024-01-10

## 2024-01-12 RX ORDER — AZITHROMYCIN 250 MG/1
TABLET, FILM COATED ORAL
Qty: 6 TABLET | Refills: 0 | Status: SHIPPED | OUTPATIENT
Start: 2024-01-12

## 2024-01-12 RX ORDER — BUDESONIDE AND FORMOTEROL FUMARATE DIHYDRATE 80; 4.5 UG/1; UG/1
2 AEROSOL RESPIRATORY (INHALATION)
Qty: 10.2 G | Refills: 1 | Status: SHIPPED | OUTPATIENT
Start: 2024-01-12

## 2024-01-12 RX ADMIN — COPPER: 313.4 INTRAUTERINE DEVICE INTRAUTERINE at 10:24

## 2024-01-12 NOTE — PROGRESS NOTES
Nexplanon Removal Procedure Note    Pre-operative Diagnosis: Nexplanon complication None    Post-operative Diagnosis: same    Indications:     Procedure Details   The risks (including infection, bruising, irregular bleeding, pain at removal site, and injury to muscles, nerves and blood vessels) and benefits of the procedure were explained to the patient and/or guardian and written informed consent was obtained.      Nexplanon device was easily located by palpation of inner arm.  The device insertion site was painted with betadine and allowed to dry.  Device site anesthetized with 3 ml 2% lidocaine with epi.  End of device was located and 11 blade was used to make small incision.  Device was located in subcutaneou tissue, grasped with hemostat and removed intact. Incision site was closed with steri-strips and band aid.   Pressure dressing applied.  There were no complications.      Pt tolerated procedure well      Condition:  Stable    Complications:  None    Plan:    The patient was advised to call for any rash, arm pain, fever, warmth or for prolonged bruising or bleeding. She was advised to use OTC acetaminophen as needed for mild to moderate pain.   Can remove pressure dressing in 24 hours. Band aid and steri strips in 2-3 days    IUD Insertion    Patient's last menstrual period was 12/10/2023 (exact date).    Date of procedure:  2024    Risks and benefits discussed? yes  All questions answered? yes  Consents given by The patient  Written consent obtained? yes    Procedure documentation:     Urine pregnancy test was done and was NEGATIVE .  The risks (including infection,  bleeding, pain, and uterine perforation) and benefits of the procedure were  explained to the patient and Written informed consent was  obtained.    After verifying the patient had a low probability of being pregnant and met the criteria for insertion, a sterile speculum has placed and the cervix was cleansed with an antiseptic  solution.  Vaginal discharge was scant.  The anterior lip of the cervix was grasped with an allis and the uterine cavity was gently sounded. There was no difficulty passing the sound through the cervix.  Cervical dilation did not need to be performed prior to placing the IUD.  The uterus was anteverted and sounded to 9 cms.  The ParaGard was then prepared per the manufacturers instructions.    The Paraguard was advanced through the cervical canal until the upper edge of the flange was flush with the external cervix.  The insertion ronna was held in place while the insertion tube was withdrawn.  I waited 10-15 seconds for the arms of the IUS to fully open and the devise to seat in the cavity.  The ronna was removed first followed by the insertion tube.. The string was cut 2 cms in length.  Bleeding from the cervix was scant.    She tolerated the procedure without any difficulty.    Post procedure instructions: The patient was advised to call for any fever or for  prolonged or severe pain or bleeding. Pelvic rest  advised for 2 wks    Follow up needed: 6 weeks for IUD check and US (no US was scheduled today and none available)

## 2024-01-12 NOTE — PROGRESS NOTES
"    I N T E R N A L  M E D I C I N E  Tamara Banks, APRN       ENCOUNTER DATE:  01/12/2024    Bettye Amador / 29 y.o. / female        CC:   (Transitional Care Follow Up Visit)  Hospital Follow Up Visit (*Acute asthma exacerbation [) and URI        Within 48 business hours after discharge our office contacted him via telephone to coordinate his care and needs.      I reviewed and discussed the details of that call along with the discharge summary, hospital problems, inpatient lab results, inpatient diagnostic studies, and consultation reports with the patient.         1/7/2024     3:57 PM   Date of TCM Phone Call   [facility]   Date of Admission 1/6/2024   Date of Discharge 1/7/2024   Discharge Disposition Home or Self Care       Risk for Readmission (LACE) Score: 6 (1/7/2024  6:00 AM)            VITALS    Visit Vitals  /84   Pulse 75   Temp 97.6 °F (36.4 °C)   Ht 170.2 cm (67.01\")   Wt 104 kg (229 lb)   LMP 12/10/2023 (Exact Date) Comment: 11/28 first day.   SpO2 97%   BMI 35.86 kg/m²       BP Readings from Last 3 Encounters:   01/12/24 130/84   01/12/24 132/74   01/07/24 134/72     Wt Readings from Last 3 Encounters:   01/12/24 104 kg (229 lb)   01/12/24 103 kg (228 lb)   01/06/24 107 kg (235 lb)      Body mass index is 35.86 kg/m².    HPI:     Date of admission/discharge: As noted above in CC  Hospital: [facility]   Principle Dx: Acute asthma exacerbation  Secondary Dx: Right ankle pain  History prior to hospitalization: Presented to the ER with complaint of right ankle pain, around the Achilles tendon area.  Complained of difficulty bearing weight.  Uric Acid was normal.  She also expressed concern for cough, along with wheezing.  Oxygenation on RA averaged high 80s/ low 90s.    Evaluation/Treatment: Right ankle XR imaging showed no acute findings and no inflammation of joint.  Chest XR was unremarkable.  Negative for influenza/ COVID/ RSV.  Admitted and treated for asthma " exacerbation with nebulizer and steroids.  Discharged with prednisone taper and Symbicort inhaler.    Course: Right ankle pain has improved.  She is able to bear weight and ambulate.    Unfortunately, she was unable to  prednisone taper until 2 days after discharge, but she has now started.  She has been unable to  Symbicort due to lack of product availability.  Patient reports subjective fever, chills since being discharged from hospital.  Continues with productive cough with green sputum, nasal congestion.  Shortness of breath with exertion.        Patient Care Team:  Tamara Banks APRN as PCP - General (Family Medicine)  ____________________________________________________________________    ASSESSMENT & PLAN:    1. Mild intermittent asthma with exacerbation      No orders of the defined types were placed in this encounter.      Summary/Discussion:  Patient with stable vital signs at today's illness, but continues with cough, wheezing, shortness of breath symptoms related to recent asthma exacerbation.  Sent Symbicort inhaler to new pharmacy.  She will let me know if unable to  inhaler.  Continue prednisone taper as prescribed.  Start Z Dandre for suspected bacterial bronchitis component of patient's asthma exacerbation.  Visit ER for acutely worsening symptoms.  Recheck in  our office in 1 week.    Return in about 1 week (around 1/19/2024) for Recheck.    ____________________________________________________________________    REVIEW OF SYSTEMS    Review of Systems   Constitutional:  Negative for chills, fever and unexpected weight change.   Respiratory:  Positive for cough, shortness of breath and wheezing. Negative for chest tightness.    Cardiovascular:  Negative for chest pain, palpitations and leg swelling.   Neurological:  Negative for dizziness, weakness, light-headedness and headaches.   Psychiatric/Behavioral:  The patient is not nervous/anxious.          PHYSICAL  EXAMINATION    Physical Exam  Vitals reviewed.   Constitutional:       General: She is not in acute distress.     Appearance: Normal appearance. She is not ill-appearing, toxic-appearing or diaphoretic.   HENT:      Head: Normocephalic and atraumatic.      Nose: Congestion present.   Cardiovascular:      Rate and Rhythm: Normal rate and regular rhythm.      Heart sounds: Normal heart sounds.   Pulmonary:      Effort: Pulmonary effort is normal. No respiratory distress.      Breath sounds: Wheezing (Minor, bilaterally) present.   Neurological:      Mental Status: She is alert and oriented to person, place, and time. Mental status is at baseline.   Psychiatric:         Mood and Affect: Mood normal.         Behavior: Behavior normal.         Thought Content: Thought content normal.         Judgment: Judgment normal.           REVIEWED DATA:    Labs:   Lab Results   Component Value Date     01/07/2024    K 4.1 01/07/2024    CALCIUM 9.2 01/07/2024    AST 19 01/06/2024    ALT 10 01/06/2024    BUN 7 01/07/2024    CREATININE 0.51 (L) 01/07/2024    CREATININE 0.71 01/06/2024    CREATININE 0.74 09/11/2023    EGFRIFAFRI >60 07/11/2021       Lab Results   Component Value Date    WBC 17.70 (H) 01/07/2024    HGB 13.2 01/07/2024    HGB 13.5 01/06/2024    HGB 12.2 09/11/2023     01/07/2024       Lab Results   Component Value Date    PROTEIN Negative 08/01/2022    GLUCOSEU Negative 05/22/2023    BLOODU Negative 05/22/2023    NITRITEU Negative 05/22/2023    LEUKOCYTESUR Negative 09/11/2023       Imaging:   XR Ankle 3+ View Right    Result Date: 1/6/2024  Narrative: 3 VIEW RIGHT ANKLE  HISTORY: Acute right ankle pain.  FINDINGS: There is no evidence of acute fracture and the ankle mortise is well preserved.  This report was finalized on 1/6/2024 10:08 AM by Dr. Dru Dong M.D on Workstation: [id number]      XR Chest 1 View    Result Date: 1/6/2024  Narrative: ONE-VIEW PORTABLE CHEST  HISTORY: Persistent cough.   FINDINGS: The lungs are well expanded and clear and the heart and hilar structures are normal. There is no acute disease.  This report was finalized on 1/6/2024 10:08 AM by Dr. Dru Dong M.D on Workstation: [id number]        Medical Tests:        Summary of old records / correspondence / consultant report:   DC summary re: issues addressed on HPI    Request outside records:         MEDICATIONS   Current Outpatient Medications   Medication Sig Dispense Refill    albuterol sulfate  (90 Base) MCG/ACT inhaler Inhale 2 puffs Every 4 (Four) Hours As Needed.      Etonogestrel (NEXPLANON) 68 MG implant subdermal implant Inject 1 each into the appropriate area of the skin as directed by provider 1 (One) Time.      Paragard Intrauterine Copper intrauterine device IUD Inserted 1/12/2024      predniSONE (DELTASONE) 10 MG tablet Take 1 tablet by mouth Take As Directed. Take 3 tablets daily x 3 days, then 2 tablets daily x 3 days, then 1 tablet daily x 3 days, then half a tablet daily x 3 days 20 tablet 0    azithromycin (Zithromax Z-Dandre) 250 MG tablet Take 2 tablets by mouth on day 1, then 1 tablet daily on days 2-5 6 tablet 0    budesonide-formoterol (Symbicort) 80-4.5 MCG/ACT inhaler Inhale 2 puffs 2 (Two) Times a Day. 10.2 g 1     No current facility-administered medications for this visit.       Current outpatient and discharge medications have been reconciled for the patient.  Reviewed by: MALACHI Lovett         @[facility] [place]@

## 2024-01-16 ENCOUNTER — OFFICE VISIT (OUTPATIENT)
Dept: SLEEP MEDICINE | Facility: HOSPITAL | Age: 29
End: 2024-01-16
Payer: COMMERCIAL

## 2024-01-16 VITALS
HEIGHT: 67 IN | HEART RATE: 87 BPM | WEIGHT: 229.8 LBS | SYSTOLIC BLOOD PRESSURE: 134 MMHG | BODY MASS INDEX: 36.07 KG/M2 | DIASTOLIC BLOOD PRESSURE: 72 MMHG | OXYGEN SATURATION: 95 %

## 2024-01-16 DIAGNOSIS — E66.9 OBESITY (BMI 30-39.9): ICD-10-CM

## 2024-01-16 DIAGNOSIS — J45.901 EXACERBATION OF ASTHMA, UNSPECIFIED ASTHMA SEVERITY, UNSPECIFIED WHETHER PERSISTENT: ICD-10-CM

## 2024-01-16 DIAGNOSIS — G47.33 OBSTRUCTIVE SLEEP APNEA: Primary | ICD-10-CM

## 2024-01-16 DIAGNOSIS — Z87.891 RECENTLY QUIT USING TOBACCO: ICD-10-CM

## 2024-01-16 PROCEDURE — G0463 HOSPITAL OUTPT CLINIC VISIT: HCPCS

## 2024-01-16 NOTE — PROGRESS NOTES
[facility] SLEEP MEDICINE  [address]  [address]  [place] [address] [phone]    Referring Physician: Tamara Banks  PCP: Tamara Banks APRN    Reason for consult:  Sleep disorders of MALLORY    Bettye Amador is a 29 y.o.female was seen in the [facility] today. She has MALLORY since childhood. Has CPAP and her supplies are outdated. Her machine smells like mildew. She sleeps from 5am to 11am. Her sleep study was in 2016. She has loud snoring and witnessed apneas. Wakes up tired and unrefreshed.  She has been smoker since age 20 and quit 1 week ago. She was in ER for asthma exacerbation and recently saw her PCP in [place].  [place] Sleepiness Score: 12. Caffeine intake 0 per day. Alcohol intake 0 per week.    Bettye Amador  has a past medical history of Asthma and Sleep apnea. ADHD, Insomnia, Anxiety, Depression    Current Medications:    Current Outpatient Medications:     albuterol sulfate  (90 Base) MCG/ACT inhaler, Inhale 2 puffs Every 4 (Four) Hours As Needed., Disp: , Rfl:     azithromycin (Zithromax Z-Dandre) 250 MG tablet, Take 2 tablets by mouth on day 1, then 1 tablet daily on days 2-5, Disp: 6 tablet, Rfl: 0    budesonide-formoterol (Symbicort) 80-4.5 MCG/ACT inhaler, Inhale 2 puffs 2 (Two) Times a Day., Disp: 10.2 g, Rfl: 1    Etonogestrel (NEXPLANON) 68 MG implant subdermal implant, Inject 1 each into the appropriate area of the skin as directed by provider 1 (One) Time., Disp: , Rfl:     Paragard Intrauterine Copper intrauterine device IUD, Inserted 1/12/2024, Disp: , Rfl:     predniSONE (DELTASONE) 10 MG tablet, Take 1 tablet by mouth Take As Directed. Take 3 tablets daily x 3 days, then 2 tablets daily x 3 days, then 1 tablet daily x 3 days, then half a tablet daily x 3 days, Disp: 20 tablet, Rfl: 0   also listed in Sleep Questionnaire.    FH: family history includes Breast cancer in her paternal grandmother; Cancer in her paternal grandfather; Diabetes in her maternal  "grandmother and mother; Heart failure in her mother; Hypertension in her maternal grandmother and mother; Mental illness in her father; Prostate cancer in her paternal grandfather.  SH:  reports that she has been smoking cigarettes. She has been smoking an average of 1 pack per day. She has never used smokeless tobacco. She reports current alcohol use of about 1.0 standard drink of alcohol per week. She reports that she does not use drugs.    Pertinent Positive Review of Systems of nasal congestion, soa, wheezing, anxiety, depression  Rest of Review of Systems was negative as recorded in Sleep Questionnaire.        Vital Signs: /72   Pulse 87   Ht 170.2 cm (67.01\")   Wt 104 kg (229 lb 12.8 oz)   LMP 01/04/2024 (Exact Date)   SpO2 95%   BMI 35.98 kg/m²     Body mass index is 35.98 kg/m².       Tongue: Large       Dentition: good       Pharynx: Posterior pharyngeal pillars are unable to see   Mallampatti: IV (only hard palate visible)        General: Alert. Cooperative. Well developed. No acute distress.             Head:  Normocephalic. Symmetrical. Atraumatic.              Eyes: Sclera clear. No icterus. PERRLA. Normal EOM.             Ears: No deformities. Normal hearing.             Nose: No septal deviation. No drainage.          Throat: No oral lesions. No thrush. Moist mucous membranes.    Chest Wall:  Normal shape. Symmetric expansion with respiration. No tenderness.             Neck:  Trachea midline.           Lungs:  Mild wheeze and rhonci. No rales. Respirations regular, even and unlabored.            Heart:  Regular rhythm and normal rate. Normal S1 and S2. No murmur.     Abdomen:  Soft, non-tender and non-distended. Normal bowel sounds. No masses.  Extremities:  Moves all extremities well. No edema.           Pulses: Pulses palpable and equal bilaterally.               Skin: Dry. Intact. No bleeding. No rash.           Neuro: Moves all 4 extremities and cranial nerves grossly " intact.  Psychiatric: Normal mood and affect.    Study:  Previous study at [facility]    Report:  No scans are attached to the encounter or orders placed in the encounter.      Impression:  1. Obstructive sleep apnea    2. Obesity (BMI 30-39.9)    3. Exacerbation of asthma, unspecified asthma severity, unspecified whether persistent    4. Recently quit using tobacco          Orders Placed This Encounter   Procedures    Home Sleep Study     Standing Status:   Future     Standing Expiration Date:   1/15/2025     Scheduling Instructions:      Ask patient to sleep on back as much as possible on night of study     Order Specific Question:   May take own meds     Answer:   Yes     Order Specific Question:   Details     Answer:   O2 Implementation per Protocol     Order Specific Question:   Release to patient     Answer:   Routine Release [[id number]]            Plan:  Bettye  would like to repeat her study and get setup with new device asap.  I have therefore ordered her an HST.  Unclear if she will be able to get her previous study from [facility].  She feels like she has a great deal of difficulty breathing at night and would like to be set up ASAP.  I did offer to try and get her previous study from [facility] but she declines.    She does seem to have some wheezing and rhonchi still from recent exacerbation.  States she is always having wheezing and rhonchi.  I initially encouraged her to wait until her initial exacerbation had resolved.  However she became very tearful.  She feels that this is constant breathing issue and is unlikely to resolve.  We therefore decided to proceed with HST ASAP.  She will address further issues with her asthma management with her PCP.    This patient has typical features of obstructive sleep apnea with hypersomnolence snoring and apneas along with elevated BMI and classic oropharyngeal structure.  Likelihood of  obstructive sleep apnea is high and a polysomnogram study will therefore be requested.     The benefit of weight loss in reducing severity of obstructive sleep apnea was discussed.  Patient would benefit from adhering to a strict diet to achieve ideal BMI.     Patient will follow up in this clinic after testing.    Thank you for allowing me to participate in your patient's care.    Electronically signed by Jeffery Juárez MD, 01/16/24, 2:58 PM EST.    Part of this note may be an electronic transcription/translation of spoken language to printed text using the Dragon Dictation System.

## 2024-02-23 ENCOUNTER — OFFICE VISIT (OUTPATIENT)
Dept: OBSTETRICS AND GYNECOLOGY | Age: 29
End: 2024-02-23
Payer: COMMERCIAL

## 2024-02-23 VITALS
SYSTOLIC BLOOD PRESSURE: 132 MMHG | WEIGHT: 229 LBS | DIASTOLIC BLOOD PRESSURE: 78 MMHG | HEIGHT: 67 IN | BODY MASS INDEX: 35.94 KG/M2

## 2024-02-23 DIAGNOSIS — N89.8 VAGINAL DISCHARGE: ICD-10-CM

## 2024-02-23 DIAGNOSIS — L29.9 GENERALIZED PRURITUS: ICD-10-CM

## 2024-02-23 DIAGNOSIS — R30.0 DYSURIA: ICD-10-CM

## 2024-02-23 DIAGNOSIS — Z30.431 IUD CHECK UP: Primary | ICD-10-CM

## 2024-02-23 LAB
BILIRUB BLD-MCNC: NEGATIVE MG/DL
CLARITY, POC: CLEAR
COLOR UR: YELLOW
GLUCOSE UR STRIP-MCNC: NEGATIVE MG/DL
KETONES UR QL: NEGATIVE
LEUKOCYTE EST, POC: NEGATIVE
NITRITE UR-MCNC: NEGATIVE MG/ML
PH UR: 0.2 [PH] (ref 5–8)
PROT UR STRIP-MCNC: NEGATIVE MG/DL
RBC # UR STRIP: NEGATIVE /UL
SP GR UR: 1.02 (ref 1–1.03)
UROBILINOGEN UR QL: ABNORMAL

## 2024-02-23 NOTE — PROGRESS NOTES
"Subjective     Chief Complaint   Patient presents with    Follow-up     IUD string check  CC::  body is itching all over, burning itching discharge       Bettye Amador is a 29 y.o.  whose LMP is No LMP recorded. Patient has had an implant.     Pt presents today for IUD check up  US confirms IUD in correct position  She is c/o vaginal discharge, itching and burning for a few weeks  Was also on a z pack and thinks maybe it caused a yeast infection  States the discharge is white, slight odor   Denies new sexual partners, not currently SA   She is also c/o itching all over her body since having the IUD placed    The following portions of the patient's history were reviewed and updated as appropriate:vital signs, allergies, current medications, past medical history, past social history, past surgical history, and problem list      Review of Systems   Pertinent items are noted in HPI.     Objective      /78   Ht 170.2 cm (67\")   Wt 104 kg (229 lb)   BMI 35.87 kg/m²     Physical Exam    General:   alert and no distress   Heart: Not performed today   Lungs: Not performed today.   Breast: Not performed today   Neck: na   Abdomen: {Not performed today   CVA: Not performed today   Pelvis: External genitalia: normal general appearance  Urinary system: urethral meatus normal  Vaginal: normal mucosa without prolapse or lesions, normal without tenderness, induration or masses, normal rugae, and discharge, white  Cervix: normal appearance and IUD string visualized   Extremities: Not performed today   Neurologic: negative   Psychiatric: Normal affect, judgement, and mood       Lab Review   Labs: UA    Imaging   Ultrasound - Pelvic Vaginal    Assessment & Plan     ASSESSMENT  1. IUD check up    2. Dysuria    3. Generalized pruritus    4. Vaginal discharge        PLAN  1.   Orders Placed This Encounter   Procedures    NuSwab BV & Candida - Swab, Vagina    Ambulatory Referral to Dermatology    POC Urinalysis Dipstick "       2. Medications prescribed this encounter:      No orders of the defined types were placed in this encounter.      3. Discussed options of allergy testing with dermatology first or taking IUD out. She would like to do the allergy testing. Swab sent for BV/Yeast, will call with results.    Follow up: REYNA Diaz, APRN  2/23/2024

## 2024-02-26 LAB
A VAGINAE DNA VAG QL NAA+PROBE: ABNORMAL SCORE
BVAB2 DNA VAG QL NAA+PROBE: ABNORMAL SCORE
C ALBICANS DNA VAG QL NAA+PROBE: NEGATIVE
C GLABRATA DNA VAG QL NAA+PROBE: NEGATIVE
MEGA1 DNA VAG QL NAA+PROBE: ABNORMAL SCORE

## 2024-02-27 RX ORDER — METRONIDAZOLE 500 MG/1
500 TABLET ORAL 2 TIMES DAILY
Qty: 14 TABLET | Refills: 0 | Status: SHIPPED | OUTPATIENT
Start: 2024-02-27 | End: 2024-03-05

## 2024-05-17 ENCOUNTER — OFFICE VISIT (OUTPATIENT)
Dept: FAMILY MEDICINE CLINIC | Facility: CLINIC | Age: 29
End: 2024-05-17
Payer: COMMERCIAL

## 2024-05-17 VITALS
HEIGHT: 67 IN | WEIGHT: 246.8 LBS | SYSTOLIC BLOOD PRESSURE: 120 MMHG | TEMPERATURE: 98.2 F | BODY MASS INDEX: 38.74 KG/M2 | OXYGEN SATURATION: 98 % | HEART RATE: 75 BPM | DIASTOLIC BLOOD PRESSURE: 80 MMHG

## 2024-05-17 DIAGNOSIS — F33.2 SEVERE EPISODE OF RECURRENT MAJOR DEPRESSIVE DISORDER, WITHOUT PSYCHOTIC FEATURES: Chronic | ICD-10-CM

## 2024-05-17 DIAGNOSIS — J45.21 MILD INTERMITTENT ASTHMA WITH EXACERBATION: ICD-10-CM

## 2024-05-17 DIAGNOSIS — F90.2 ATTENTION DEFICIT HYPERACTIVITY DISORDER (ADHD), COMBINED TYPE: Chronic | ICD-10-CM

## 2024-05-17 DIAGNOSIS — Z76.89 ENCOUNTER TO ESTABLISH CARE: Primary | ICD-10-CM

## 2024-05-17 DIAGNOSIS — F32.A DEPRESSION, UNSPECIFIED DEPRESSION TYPE: ICD-10-CM

## 2024-05-17 DIAGNOSIS — E55.9 VITAMIN D DEFICIENCY: ICD-10-CM

## 2024-05-17 DIAGNOSIS — J30.89 ENVIRONMENTAL AND SEASONAL ALLERGIES: ICD-10-CM

## 2024-05-17 DIAGNOSIS — J45.20 MILD INTERMITTENT ASTHMA WITHOUT COMPLICATION: Chronic | ICD-10-CM

## 2024-05-17 DIAGNOSIS — R53.83 OTHER FATIGUE: ICD-10-CM

## 2024-05-17 DIAGNOSIS — G47.30 SLEEP APNEA, UNSPECIFIED TYPE: Chronic | ICD-10-CM

## 2024-05-17 DIAGNOSIS — G47.00 INSOMNIA, UNSPECIFIED TYPE: ICD-10-CM

## 2024-05-17 PROBLEM — J45.901 ACUTE ASTHMA EXACERBATION: Status: RESOLVED | Noted: 2024-01-06 | Resolved: 2024-05-17

## 2024-05-17 PROCEDURE — 99214 OFFICE O/P EST MOD 30 MIN: CPT | Performed by: STUDENT IN AN ORGANIZED HEALTH CARE EDUCATION/TRAINING PROGRAM

## 2024-05-17 RX ORDER — CETIRIZINE HYDROCHLORIDE 10 MG/1
10 TABLET ORAL NIGHTLY
Qty: 30 TABLET | Refills: 5 | Status: SHIPPED | OUTPATIENT
Start: 2024-05-17

## 2024-05-17 RX ORDER — BUPROPION HYDROCHLORIDE 150 MG/1
150 TABLET ORAL DAILY
Qty: 30 TABLET | Refills: 3 | Status: SHIPPED | OUTPATIENT
Start: 2024-05-17

## 2024-05-17 RX ORDER — FLUTICASONE PROPIONATE 50 MCG
2 SPRAY, SUSPENSION (ML) NASAL DAILY
Qty: 16 G | Refills: 11 | Status: SHIPPED | OUTPATIENT
Start: 2024-05-17

## 2024-05-17 RX ORDER — HYDROXYZINE HYDROCHLORIDE 25 MG/1
50 TABLET, FILM COATED ORAL NIGHTLY PRN
Qty: 60 TABLET | Refills: 1 | Status: SHIPPED | OUTPATIENT
Start: 2024-05-17 | End: 2024-07-16

## 2024-05-17 RX ORDER — BUDESONIDE AND FORMOTEROL FUMARATE DIHYDRATE 80; 4.5 UG/1; UG/1
2 AEROSOL RESPIRATORY (INHALATION)
Qty: 10.2 G | Refills: 5 | Status: SHIPPED | OUTPATIENT
Start: 2024-05-17

## 2024-05-17 RX ORDER — ALBUTEROL SULFATE 90 UG/1
2 AEROSOL, METERED RESPIRATORY (INHALATION) EVERY 6 HOURS PRN
Qty: 18 G | Refills: 5 | Status: SHIPPED | OUTPATIENT
Start: 2024-05-17

## 2024-05-17 NOTE — ASSESSMENT & PLAN NOTE
Patient's depression is a recurrent episode that is severe without psychosis. Depression is active and worsening.    Plan:   Begin new antidepressant medicine; Wellbutrin XL    Followup in 4 weeks.   Patient denies any suicidal or homicidal ideation.        Patient scored very high on PHQ-9 today but denies any suicidal or homicidal ideation.  Patient desires to start Wellbutrin today started Wellbutrin per patient request.  Instructed patient to discuss medication efficacy and tolerance with psychiatry and if she is unable to see psychiatrist within 1 month return to clinic for follow-up on depression and Wellbutrin, patient was understanding.  Also instructed go to emergency room for her depression symptoms if they get worse and preferably go to [place] ER, [facility] ER or [facility] ER for psych symptoms.

## 2024-05-17 NOTE — PROGRESS NOTES
"Chief Complaint  Establish Care (Pt is here to establish care and starting a medication Welbutrin )    Subjective        Bettye Amador presents to [facility] MEDICAL [facility] PRIMARY CARE  History of Present Illness    28yo AAF with a history of long standing depression was previously not seen by PCP at [facility], and patient is new to me and is here for establishment of care visit for chronic medical conditions including depression.      Pt used to f/u Tamara Rogers for PCP in past but left past PCP and was discharged for missed appts.  Pt reports she has a hx of depression since age 4 years. Pt rpoerts last PCP she had discussed wellbutrin but failed as a teenager.  Pt states she has tried 7 different meds over her lifetime for depression and failed them as a teenager.  No SI/HI.    Pt reports asthma under control.  Pt s/p Copper IUD placed in Feb 2024 and follows OB-Gyn.  Pt reports moved from [facility] 3 years ago.  Instructed patient to get the adult preventive immunization that are due at  the pharmacy, including -COVID booster.    Patient complaining of fatigue going on for some time.  Patient also complaining of snoring and desires sleep medicine referral.  Patient reports she was diagnosed with sleep apnea in the past however does not any machine for sleep apnea at this time.      Objective   Vital Signs:  /80 (BP Location: Left arm, Patient Position: Sitting, Cuff Size: Adult)   Pulse 75   Temp 98.2 °F (36.8 °C) (Temporal)   Ht 170.2 cm (67.01\")   Wt 112 kg (246 lb 12.8 oz)   SpO2 98%   BMI 38.65 kg/m²   Estimated body mass index is 38.65 kg/m² as calculated from the following:    Height as of this encounter: 170.2 cm (67.01\").    Weight as of this encounter: 112 kg (246 lb 12.8 oz).               Physical Exam  Constitutional:       Appearance: Normal appearance. She is obese.   HENT:      Head: Normocephalic and atraumatic.   Eyes:      " Conjunctiva/sclera: Conjunctivae normal.   Cardiovascular:      Rate and Rhythm: Normal rate and regular rhythm.      Heart sounds: Normal heart sounds.   Pulmonary:      Effort: Pulmonary effort is normal.      Breath sounds: Normal breath sounds.   Abdominal:      General: Bowel sounds are normal.      Palpations: Abdomen is soft.      Comments: Non-tender   Skin:     General: Skin is warm.   Neurological:      General: No focal deficit present.      Mental Status: She is alert and oriented to person, place, and time.   Psychiatric:         Mood and Affect: Mood normal.         Behavior: Behavior normal.        Result Review :    The following data was reviewed by: MALACHI Pace on 05/17/2024:  CMP          9/11/2023    10:48 1/6/2024    10:15 1/7/2024    07:32   CMP   Glucose 83  88  127    BUN 12  9  7    Creatinine 0.74  0.71  0.51    EGFR  118.2  129.8    Sodium 136  138  139    Potassium 4.0  4.5  4.1    Chloride 103  104  107    Calcium 9.7  9.3  9.2    Total Protein 6.5      Total Protein  7.4     Albumin 4.3  4.0     Globulin 2.2      Globulin  3.4     Total Bilirubin <0.2  0.5     Alkaline Phosphatase 56  61     AST (SGOT) 18  19     ALT (SGPT) 14  10     Albumin/Globulin Ratio  1.2     BUN/Creatinine Ratio 16.2  12.7  13.7    Anion Gap  16.4  12.4      CBC          9/11/2023    10:48 1/6/2024    10:15 1/7/2024    07:32   CBC   WBC 9.31  10.45  17.70    RBC 3.77  4.47  4.12    Hemoglobin 12.2  13.5  13.2    Hematocrit 35.6  40.6  38.4    MCV 94.4  90.8  93.2    MCH 32.4  30.2  32.0    MCHC 34.3  33.3  34.4    RDW 11.7  11.8  11.9    Platelets 191  202  202      Lipid Panel          9/11/2023    10:48   Lipid Panel   Total Cholesterol 134    Triglycerides 38    HDL Cholesterol 59    VLDL Cholesterol 10    LDL Cholesterol  65      TSH          9/11/2023    10:48   TSH   TSH 0.796      A1C Last 3 Results          9/11/2023    10:48   HGBA1C Last 3 Results   Hemoglobin A1C 5.00          Data  reviewed : Consultant notes reviewed sleep study consult note from January 2024.  Reviewed ER note from January 2024 for asthma exacerbation.             Assessment and Plan     Diagnoses and all orders for this visit:    1. Encounter to establish care (Primary)    2. Severe episode of recurrent major depressive disorder, without psychotic features  Assessment & Plan:  Patient's depression is a recurrent episode that is severe without psychosis. Depression is active and worsening.    Plan:   Begin new antidepressant medicine; Wellbutrin XL    Followup in 4 weeks.   Patient denies any suicidal or homicidal ideation.        Patient scored very high on PHQ-9 today but denies any suicidal or homicidal ideation.  Patient desires to start Wellbutrin today started Wellbutrin per patient request.  Instructed patient to discuss medication efficacy and tolerance with psychiatry and if she is unable to see psychiatrist within 1 month return to clinic for follow-up on depression and Wellbutrin, patient was understanding.  Also instructed go to emergency room for her depression symptoms if they get worse and preferably go to [place] [facility], [facility] ER or [facility] ER for psych symptoms.      3. Attention deficit hyperactivity disorder (ADHD), combined type  -     Ambulatory Referral to Psychology  -     Ambulatory Referral to Psychiatry    4. Depression, unspecified depression type  Assessment & Plan:      Orders:  -     Ambulatory Referral to Psychology  -     Ambulatory Referral to Psychiatry  -     buPROPion XL (Wellbutrin XL) 150 MG 24 hr tablet; Take 1 tablet by mouth Daily.  Dispense: 30 tablet; Refill: 3  -     TSH  -     T4, free    5. Sleep apnea, unspecified type  -     Ambulatory Referral to Sleep Medicine    6. Mild intermittent asthma without complication  Assessment & Plan:  Asthma is stable.        7. Mild intermittent asthma with exacerbation  -     albuterol sulfate  (90 Base) MCG/ACT inhaler; Inhale 2  puffs Every 6 (Six) Hours As Needed for Wheezing or Shortness of Air.  Dispense: 18 g; Refill: 5  -     budesonide-formoterol (Symbicort) 80-4.5 MCG/ACT inhaler; Inhale 2 puffs 2 (Two) Times a Day. Please rinse mouth with water after each use.  Dispense: 10.2 g; Refill: 5    8. Environmental and seasonal allergies  Assessment & Plan:  Started patient on cetirizine and Flonase.    Orders:  -     fluticasone (FLONASE) 50 MCG/ACT nasal spray; 2 sprays into the nostril(s) as directed by provider Daily.  Dispense: 16 g; Refill: 11  -     cetirizine (zyrTEC) 10 MG tablet; Take 1 tablet by mouth Every Night.  Dispense: 30 tablet; Refill: 5    9. Insomnia, unspecified type  -     hydrOXYzine (ATARAX) 25 MG tablet; Take 2 tablets by mouth At Night As Needed for Itching, Allergies or Anxiety for up to 60 days.  Dispense: 60 tablet; Refill: 1    10. Other fatigue  -     CBC and Differential  -     Comprehensive metabolic panel  -     Iron  -     Vitamin B12  -     Vitamin D 1,25 dihydroxy  -     TSH  -     T4, free    11. Vitamin D deficiency  -     Vitamin D 1,25 dihydroxy        All chronic conditions have been addressed and treated by the practice or other specialists. Medications have been reconciled and refilled as appropriate. Reiterated compliance and timely follow up appointments. Side effects of all new and old medications reviewed with the patient and patient willing to accept all risks involved. Advised RTO if no improvement or worsening of symptoms or if any new complaints arise. Patient advised to follow up with clinic or call after diagnostic tests, if patient does not hear from office 3 days after the test completion.          Follow Up     Return in about 4 weeks (around 6/14/2024) for Next scheduled follow up.  Patient was given instructions and counseling regarding her condition or for health maintenance advice. Please see specific information pulled into the AVS if appropriate.

## 2024-07-22 ENCOUNTER — TELEPHONE (OUTPATIENT)
Dept: FAMILY MEDICINE CLINIC | Facility: CLINIC | Age: 29
End: 2024-07-22

## 2024-07-22 ENCOUNTER — OFFICE VISIT (OUTPATIENT)
Dept: FAMILY MEDICINE CLINIC | Facility: CLINIC | Age: 29
End: 2024-07-22
Payer: COMMERCIAL

## 2024-07-22 VITALS
TEMPERATURE: 97.7 F | DIASTOLIC BLOOD PRESSURE: 74 MMHG | HEART RATE: 90 BPM | HEIGHT: 67 IN | SYSTOLIC BLOOD PRESSURE: 130 MMHG | WEIGHT: 234.2 LBS | OXYGEN SATURATION: 100 % | BODY MASS INDEX: 36.76 KG/M2

## 2024-07-22 DIAGNOSIS — J30.89 ENVIRONMENTAL AND SEASONAL ALLERGIES: ICD-10-CM

## 2024-07-22 DIAGNOSIS — Z59.819 HOUSING INSECURITY: Primary | ICD-10-CM

## 2024-07-22 DIAGNOSIS — J45.21 MILD INTERMITTENT ASTHMA WITH EXACERBATION: Chronic | ICD-10-CM

## 2024-07-22 DIAGNOSIS — F32.A DEPRESSION, UNSPECIFIED DEPRESSION TYPE: Chronic | ICD-10-CM

## 2024-07-22 DIAGNOSIS — L03.039 ABSCESS AROUND GREAT TOENAIL: ICD-10-CM

## 2024-07-22 PROCEDURE — 99214 OFFICE O/P EST MOD 30 MIN: CPT | Performed by: STUDENT IN AN ORGANIZED HEALTH CARE EDUCATION/TRAINING PROGRAM

## 2024-07-22 RX ORDER — BUDESONIDE AND FORMOTEROL FUMARATE DIHYDRATE 80; 4.5 UG/1; UG/1
2 AEROSOL RESPIRATORY (INHALATION)
Qty: 10.2 G | Refills: 5 | Status: SHIPPED | OUTPATIENT
Start: 2024-07-22

## 2024-07-22 RX ORDER — FLUCONAZOLE 150 MG/1
150 TABLET ORAL WEEKLY
Qty: 2 TABLET | Refills: 0 | Status: SHIPPED | OUTPATIENT
Start: 2024-07-22

## 2024-07-22 RX ORDER — FLUTICASONE PROPIONATE 50 MCG
2 SPRAY, SUSPENSION (ML) NASAL DAILY
Qty: 16 G | Refills: 11 | Status: SHIPPED | OUTPATIENT
Start: 2024-07-22

## 2024-07-22 RX ORDER — ALBUTEROL SULFATE 90 UG/1
2 AEROSOL, METERED RESPIRATORY (INHALATION) EVERY 6 HOURS PRN
Qty: 18 G | Refills: 5 | Status: SHIPPED | OUTPATIENT
Start: 2024-07-22

## 2024-07-22 RX ORDER — SULFAMETHOXAZOLE AND TRIMETHOPRIM 800; 160 MG/1; MG/1
1 TABLET ORAL 2 TIMES DAILY
Qty: 20 TABLET | Refills: 0 | Status: SHIPPED | OUTPATIENT
Start: 2024-07-22

## 2024-07-22 RX ORDER — BUPROPION HYDROCHLORIDE 150 MG/1
150 TABLET ORAL DAILY
Qty: 30 TABLET | Refills: 3 | Status: SHIPPED | OUTPATIENT
Start: 2024-07-22

## 2024-07-22 RX ORDER — CETIRIZINE HYDROCHLORIDE 10 MG/1
10 TABLET ORAL NIGHTLY
Qty: 30 TABLET | Refills: 5 | Status: SHIPPED | OUTPATIENT
Start: 2024-07-22

## 2024-07-22 SDOH — ECONOMIC STABILITY - HOUSING INSECURITY: HOUSING INSTABILITY UNSPECIFIED: Z59.819

## 2024-07-22 NOTE — ASSESSMENT & PLAN NOTE
Instructed patient to Return to Office as needed for persistent or new symptoms and/or go to ER for worsening symptoms, patient voiced understanding.

## 2024-07-22 NOTE — ASSESSMENT & PLAN NOTE
Instructed patient to  Wellbutrin from  a pharmacy that has a 4 out of program.  Patient denies any suicidal or homicidal ideation.  Provided patient letter to keep her 2 dogs for therapeutic purposes as patient states 1 dog consoles her and the other 1 helps her fall asleep.

## 2024-07-22 NOTE — PROGRESS NOTES
"Chief Complaint  ADHD, Depression, and Abscess (L foot; between big and next toe.  Noticed about 2-3 weeks ago, popped (pus with slight blood) and draining with pain.  )    Subjective        Bettye Amador presents to [facility] PRIMARY [facility]ARE  History of Present Illness  29-year-old -American female with a history of anxiety and depression here for follow-up visit.  Patient was in tears today at the clinic reporting that her landlord would admit her due to her dogs and patient desires a letter.  Also discussed social work consult for housing insecurity, patient was understanding.    Patient denied any suicidal or homicidal ideation.    Pt reports hx of anx/depression and emotional support animals help. Pt reports she has a she has a mahan doodle about 40 lb and a aussie doodle and pt desires a letter to keep her dogs in her apartment.    Patient also complaining of an abscess between her first and second toe on the left.  Patient reports she has not picked up her medications yet due to co-pay issues.  Instructed patient to follow-up with either [facility] or [facility] pharmacy that has a $4 formulary program and bupropion is on the $4 program along with other medications, patient voiced understanding.  Also instructed patient to download the [employer] violeta..    Patient denied allergy to medication Bactrim or sulfa drugs.  Patient reports she needs fluconazole as she gets yeast vaginitis when she takes antibiotics.  Depression  Her past medical history is significant for depression.   Abscess        Objective   Vital Signs:  /74   Pulse 90   Temp 97.7 °F (36.5 °C) (Temporal)   Ht 170.2 cm (67.01\")   Wt 106 kg (234 lb 3.2 oz)   SpO2 100%   BMI 36.67 kg/m²   Estimated body mass index is 36.67 kg/m² as calculated from the following:    Height as of this encounter: 170.2 cm (67.01\").    Weight as of this encounter: 106 kg (234 lb 3.2 oz).               Physical Exam  Constitutional:       " Appearance: Normal appearance.   HENT:      Head: Normocephalic and atraumatic.   Eyes:      Conjunctiva/sclera: Conjunctivae normal.   Cardiovascular:      Rate and Rhythm: Normal rate and regular rhythm.      Heart sounds: Normal heart sounds.   Pulmonary:      Effort: Pulmonary effort is normal.      Breath sounds: Normal breath sounds.   Abdominal:      General: Bowel sounds are normal.      Palpations: Abdomen is soft.      Comments: Non-tender   Skin:     General: Skin is warm.      Comments: Resolving pus pocket lesion in the first and second left foot toes   Neurological:      General: No focal deficit present.      Mental Status: She is alert and oriented to person, place, and time.   Psychiatric:         Mood and Affect: Mood normal.         Behavior: Behavior normal.        Result Review :    The following data was reviewed by: MALACHI Pace on 07/22/2024:  CMP          9/11/2023    10:48 1/6/2024    10:15 1/7/2024    07:32   CMP   Glucose 83  88  127    BUN 12  9  7    Creatinine 0.74  0.71  0.51    EGFR  118.2  129.8    Sodium 136  138  139    Potassium 4.0  4.5  4.1    Chloride 103  104  107    Calcium 9.7  9.3  9.2    Total Protein 6.5      Total Protein  7.4     Albumin 4.3  4.0     Globulin 2.2      Globulin  3.4     Total Bilirubin <0.2  0.5     Alkaline Phosphatase 56  61     AST (SGOT) 18  19     ALT (SGPT) 14  10     Albumin/Globulin Ratio  1.2     BUN/Creatinine Ratio 16.2  12.7  13.7    Anion Gap  16.4  12.4      CBC          9/11/2023    10:48 1/6/2024    10:15 1/7/2024    07:32   CBC   WBC 9.31  10.45  17.70    RBC 3.77  4.47  4.12    Hemoglobin 12.2  13.5  13.2    Hematocrit 35.6  40.6  38.4    MCV 94.4  90.8  93.2    MCH 32.4  30.2  32.0    MCHC 34.3  33.3  34.4    RDW 11.7  11.8  11.9    Platelets 191  202  202      Lipid Panel          9/11/2023    10:48   Lipid Panel   Total Cholesterol 134    Triglycerides 38    HDL Cholesterol 59    VLDL Cholesterol 10    LDL  Cholesterol  65      TSH          9/11/2023    10:48   TSH   TSH 0.796      A1C Last 3 Results          9/11/2023    10:48   HGBA1C Last 3 Results   Hemoglobin A1C 5.00                       Assessment and Plan     Diagnoses and all orders for this visit:    1. Housing insecurity (Primary)  Assessment & Plan:  Patient agreeable to  consult for housing insecurity.    Orders:  -     Ambulatory Referral to Social Care Services (Amb Case Mgmt)    2. Abscess around great toenail  Comments:  in the left first and 2nd toe web area  Assessment & Plan:  Instructed patient to Return to Office as needed for persistent or new symptoms and/or go to ER for worsening symptoms, patient voiced understanding.       Orders:  -     sulfamethoxazole-trimethoprim (BACTRIM DS,SEPTRA DS) 800-160 MG per tablet; Take 1 tablet by mouth 2 (Two) Times a Day.  Dispense: 20 tablet; Refill: 0  -     fluconazole (DIFLUCAN) 150 MG tablet; Take 1 tablet by mouth 1 (One) Time Per Week.  Dispense: 2 tablet; Refill: 0    3. Environmental and seasonal allergies  Assessment & Plan:  Stable    Orders:  -     cetirizine (zyrTEC) 10 MG tablet; Take 1 tablet by mouth Every Night.  Dispense: 30 tablet; Refill: 5  -     fluticasone (FLONASE) 50 MCG/ACT nasal spray; 2 sprays into the nostril(s) as directed by provider Daily.  Dispense: 16 g; Refill: 11    4. Depression, unspecified depression type  Assessment & Plan:  Instructed patient to  Wellbutrin from  a pharmacy that has a 4 out of program.  Patient denies any suicidal or homicidal ideation.  Provided patient letter to keep her 2 dogs for therapeutic purposes as patient states 1 dog consoles her and the other 1 helps her fall asleep.    Orders:  -     buPROPion XL (Wellbutrin XL) 150 MG 24 hr tablet; Take 1 tablet by mouth Daily.  Dispense: 30 tablet; Refill: 3    5. Mild intermittent asthma with exacerbation  Assessment & Plan:  Stable          Orders:  -     budesonide-formoterol  (Symbicort) 80-4.5 MCG/ACT inhaler; Inhale 2 puffs 2 (Two) Times a Day. Please rinse mouth with water after each use.  Dispense: 10.2 g; Refill: 5  -     albuterol sulfate  (90 Base) MCG/ACT inhaler; Inhale 2 puffs Every 6 (Six) Hours As Needed for Wheezing or Shortness of Air.  Dispense: 18 g; Refill: 5      Provided patient letter to keep her dogs in her apartment for therapeutic purpose.  Patient reports her dogs are benign and did not bite.      All chronic conditions have been addressed and treated by the practice or other specialists. Medications have been reconciled and refilled as appropriate. Reiterated compliance and timely follow up appointments. Side effects of all new and old medications reviewed with the patient and patient willing to accept all risks involved. Advised RTO if no improvement or worsening of symptoms or if any new complaints arise. Patient advised to follow up with clinic or call after diagnostic tests, if patient does not hear from office 3 days after the test completion.          Follow Up     No follow-ups on file.  Patient was given instructions and counseling regarding her condition or for health maintenance advice. Please see specific information pulled into the AVS if appropriate.

## 2024-07-23 ENCOUNTER — REFERRAL TRIAGE (OUTPATIENT)
Age: 29
End: 2024-07-23
Payer: COMMERCIAL

## 2024-07-25 ENCOUNTER — PATIENT OUTREACH (OUTPATIENT)
Age: 29
End: 2024-07-25
Payer: COMMERCIAL

## 2024-07-25 NOTE — OUTREACH NOTE
MSW received referral from primary care providers office for assistance with community resources. MSW outreach to patient by phone and MSW left message and call back number. MSW will continue to attempt outreach for assistance.     Krystal WHITLEY -   Ambulatory Case Management    7/25/2024, 09:58 EDT

## 2024-08-12 ENCOUNTER — PATIENT OUTREACH (OUTPATIENT)
Age: 29
End: 2024-08-12
Payer: COMMERCIAL

## 2024-08-12 NOTE — OUTREACH NOTE
MSW unable to reach patient on 3 telephone attempts with voicemail messages as requested per primary care provider's office for assistance with community resource needs. MSW to sign off due inability to reach patient.    Krystal WHITLEY -   Ambulatory Case Management    8/12/2024, 14:29 EDT